# Patient Record
Sex: MALE | Race: WHITE | Employment: UNEMPLOYED | URBAN - METROPOLITAN AREA
[De-identification: names, ages, dates, MRNs, and addresses within clinical notes are randomized per-mention and may not be internally consistent; named-entity substitution may affect disease eponyms.]

---

## 2017-08-04 ENCOUNTER — HOSPITAL ENCOUNTER (OUTPATIENT)
Dept: CARDIAC CATH/INVASIVE PROCEDURES | Age: 57
Discharge: HOME | End: 2017-08-04
Attending: INTERNAL MEDICINE | Admitting: INTERNAL MEDICINE
Payer: SUBSIDIZED

## 2017-08-04 VITALS
OXYGEN SATURATION: 93 % | SYSTOLIC BLOOD PRESSURE: 95 MMHG | HEIGHT: 68 IN | RESPIRATION RATE: 12 BRPM | TEMPERATURE: 97.7 F | BODY MASS INDEX: 31.52 KG/M2 | DIASTOLIC BLOOD PRESSURE: 55 MMHG | WEIGHT: 208 LBS | HEART RATE: 59 BPM

## 2017-08-04 PROCEDURE — 74011250636 HC RX REV CODE- 250/636: Performed by: INTERNAL MEDICINE

## 2017-08-04 PROCEDURE — 77030013744

## 2017-08-04 PROCEDURE — 77030004533 HC CATH ANGI DX IMP BSC -B

## 2017-08-04 PROCEDURE — C1760 CLOSURE DEV, VASC: HCPCS

## 2017-08-04 PROCEDURE — C1894 INTRO/SHEATH, NON-LASER: HCPCS

## 2017-08-04 PROCEDURE — 93458 L HRT ARTERY/VENTRICLE ANGIO: CPT

## 2017-08-04 PROCEDURE — 74011000250 HC RX REV CODE- 250: Performed by: INTERNAL MEDICINE

## 2017-08-04 RX ORDER — ATROPINE SULFATE 0.1 MG/ML
.5-1 INJECTION INTRAVENOUS AS NEEDED
Status: DISCONTINUED | OUTPATIENT
Start: 2017-08-04 | End: 2017-08-04 | Stop reason: HOSPADM

## 2017-08-04 RX ORDER — SODIUM CHLORIDE 9 MG/ML
3 INJECTION, SOLUTION INTRAVENOUS CONTINUOUS
Status: DISPENSED | OUTPATIENT
Start: 2017-08-04 | End: 2017-08-04

## 2017-08-04 RX ORDER — FENTANYL CITRATE 50 UG/ML
25-200 INJECTION, SOLUTION INTRAMUSCULAR; INTRAVENOUS
Status: DISCONTINUED | OUTPATIENT
Start: 2017-08-04 | End: 2017-08-04 | Stop reason: HOSPADM

## 2017-08-04 RX ORDER — SODIUM CHLORIDE 0.9 % (FLUSH) 0.9 %
5-10 SYRINGE (ML) INJECTION AS NEEDED
Status: DISCONTINUED | OUTPATIENT
Start: 2017-08-04 | End: 2017-08-04 | Stop reason: HOSPADM

## 2017-08-04 RX ORDER — HEPARIN SODIUM 1000 [USP'U]/ML
1000-5000 INJECTION, SOLUTION INTRAVENOUS; SUBCUTANEOUS
Status: DISCONTINUED | OUTPATIENT
Start: 2017-08-04 | End: 2017-08-04 | Stop reason: HOSPADM

## 2017-08-04 RX ORDER — CLOPIDOGREL 300 MG/1
600 TABLET, FILM COATED ORAL ONCE
Status: DISCONTINUED | OUTPATIENT
Start: 2017-08-04 | End: 2017-08-04 | Stop reason: HOSPADM

## 2017-08-04 RX ORDER — SODIUM CHLORIDE 9 MG/ML
1.5 INJECTION, SOLUTION INTRAVENOUS CONTINUOUS
Status: DISPENSED | OUTPATIENT
Start: 2017-08-04 | End: 2017-08-04

## 2017-08-04 RX ORDER — MIDAZOLAM HYDROCHLORIDE 1 MG/ML
.5-1 INJECTION, SOLUTION INTRAMUSCULAR; INTRAVENOUS
Status: DISCONTINUED | OUTPATIENT
Start: 2017-08-04 | End: 2017-08-04 | Stop reason: HOSPADM

## 2017-08-04 RX ORDER — HEPARIN SODIUM 200 [USP'U]/100ML
2000 INJECTION, SOLUTION INTRAVENOUS AS NEEDED
Status: DISCONTINUED | OUTPATIENT
Start: 2017-08-04 | End: 2017-08-04 | Stop reason: HOSPADM

## 2017-08-04 RX ORDER — SPIRONOLACTONE 50 MG/1
50 TABLET, FILM COATED ORAL DAILY
Qty: 30 TAB | Refills: 6 | Status: SHIPPED | OUTPATIENT
Start: 2017-08-04

## 2017-08-04 RX ORDER — LIDOCAINE HYDROCHLORIDE 10 MG/ML
10-30 INJECTION INFILTRATION; PERINEURAL
Status: DISCONTINUED | OUTPATIENT
Start: 2017-08-04 | End: 2017-08-04 | Stop reason: HOSPADM

## 2017-08-04 RX ADMIN — FENTANYL CITRATE 50 MCG: 50 INJECTION, SOLUTION INTRAMUSCULAR; INTRAVENOUS at 09:22

## 2017-08-04 RX ADMIN — HEPARIN SODIUM 2000 UNITS: 200 INJECTION, SOLUTION INTRAVENOUS at 09:29

## 2017-08-04 RX ADMIN — FENTANYL CITRATE 50 MCG: 50 INJECTION, SOLUTION INTRAMUSCULAR; INTRAVENOUS at 09:33

## 2017-08-04 RX ADMIN — MIDAZOLAM HYDROCHLORIDE 2 MG: 1 INJECTION, SOLUTION INTRAMUSCULAR; INTRAVENOUS at 09:22

## 2017-08-04 RX ADMIN — Medication 10 ML: at 09:37

## 2017-08-04 RX ADMIN — MIDAZOLAM HYDROCHLORIDE 2 MG: 1 INJECTION, SOLUTION INTRAMUSCULAR; INTRAVENOUS at 09:33

## 2017-08-04 RX ADMIN — MIDAZOLAM HYDROCHLORIDE 1 MG: 1 INJECTION, SOLUTION INTRAMUSCULAR; INTRAVENOUS at 09:39

## 2017-08-04 RX ADMIN — SODIUM CHLORIDE 3 ML/KG/HR: 900 INJECTION, SOLUTION INTRAVENOUS at 08:25

## 2017-08-04 RX ADMIN — LIDOCAINE HYDROCHLORIDE 10 ML: 10 INJECTION, SOLUTION INFILTRATION; PERINEURAL at 09:36

## 2017-08-04 RX ADMIN — SODIUM CHLORIDE 1.5 ML/KG/HR: 900 INJECTION, SOLUTION INTRAVENOUS at 09:30

## 2017-08-04 NOTE — PROGRESS NOTES
TRANSFER - IN REPORT:    Verbal report received from Yisel Campbell RN on Soraya Ramos, Procedure Cath with no stent , from the Cardiac Cath lab, for routine progression of care. Report consisted of patients Situation, Background, Assessment and Recommendations(SBAR). Information from the following report(s) Procedure Summary, MAR and Recent Results was reviewed with the receiving clinician. Opportunity for questions and clarification was provided. Assessment completed upon patients arrival to [address] [place] [address] and care assumed. Cardiac Cath Lab Recovery Arrival Note:     Soraya Ramos arrived to [facility] recovery area. Patient procedure= Cardiac cath. Patient on cardiac monitor, non-invasive blood pressure, Patient status doing well without problems. Patient is Arousable&Ox 4. Patient reports no pain. Procedure site without any bleeding and no hematoma. Ulises Chávez

## 2017-08-04 NOTE — DISCHARGE INSTRUCTIONS
[email or web address] [facility]    Patient Discharge Instructions    Beatriz Webb / [id number] : 1960    Admitted 2017 Discharged: 2017       · It is important that you take the medication exactly as they are prescribed. · Keep your medication in the bottles provided by the pharmacist and keep a list of the medication names, dosages, and times to be taken in your wallet. · Do not take other medications without consulting your doctor. BRING ALL OF YOUR MEDICINES TO YOUR OFFICE VISIT with Dr. Donnis Mortimer with Leticia Patel MD in 2 week      Cardiac Catheterization  Discharge Instructions     Do not drive, operate any machinery, or sign any legal documents for 24 hours after your procedure. You must have someone to drive you home.  You may take a shower 24 hours after your cardiac catheterization. Be sure to get the dressing wet and then remove it; gently wash the area with warm soapy water. Pat dry and leave open to air. To help prevent infections, be sure to keep the cath site clean and dry. No lotions, creams, powders, ointments, etc. in the cath site for approximately 1 week.  Do not take a tub bath, get in a hot tub or swimming pool for approximately 5 days or until the cath site is completely healed.  No strenuous activity or heavy lifting over 10 lbs. for 7 days.  Drink plenty of fluids for 24-48 hours after your cath to flush the contrast dye from your kidneys. No alcoholic beverages for 24 hours. You may resume your previous diet (low fat, low cholesterol) after your cath.  After your cath, some bruising or discomfort is common during the healing process. Tylenol, 1-2 tablets every 6 hours as needed, is recommended if you experience any discomfort.   If you experience any signs or symptoms of infection such as fever, chills, or poorly healing incision, persistent tenderness or swelling in the groin, redness and/or warmth to the touch, numbness, significant tingling or pain at the groin site or affected extremity, rash, drainage from the cath site, or if the leg feels tight or swollen, call your physician right away.  If bleeding at the cath site occurs, take a clean gauze pad and apply direct pressure to the groin just above the puncture site. Call 911 immediately, and continue to apply direct pressure until an ambulance gets to your location.  You may return to work  2  days after your cardiac cath if no groin bleeding. Information obtained by :  I understand that if any problems occur once I am at home I am to contact my physician. I understand and acknowledge receipt of the instructions indicated above.                                                                                                                                            R.N.'s Signature                                                                  Date/Time                                                                                                                                              Patient or Representative Signature                                                          Date/Time      Paresh Smith MD

## 2017-08-04 NOTE — ROUTINE PROCESS
Cardiac Cath Lab Recovery Arrival Note:      Beatriz Webb arrived to Cardiac Cath Lab, Recovery Area. Staff introduced to patient. Patient identifiers verified with NAME and DATE OF BIRTH. Procedure verified with patient. Consent forms reviewed and signed by patient or authorized representative and verified. Allergies verified. Patient and family oriented to department. Patient and family informed of procedure and plan of care. Questions answered with review. Patient prepped for procedure, per orders from physician, prior to arrival.    Patient on cardiac monitor, non-invasive blood pressure, SPO2 monitor. On room air. Patient is A&Ox 3. Patient reports no CP. Patient in stretcher, in low position, with side rails up, call bell within reach, patient instructed to call if assistance as needed.     Patient prep in: [facility] Recovery Area, [place] 2  Prep by: DON

## 2017-08-04 NOTE — IP AVS SNAPSHOT
[address] [facility] [address] [place] [address] 
[phone] Patient: Cleo Cooper MRN: [id number] You are allergic to the following Allergen Reactions Lisinopril Other (comments) Elevated K+ Codeine Nausea and Vomiting Hydrocodone Other (comments)  
 hypotension Recent Documentation Height Weight BMI Smoking Status 1.727 m 94.3 kg 31.63 kg/m2 Current Every Day Smoker Emergency Contacts Name Discharge Info Relation Home Work Mobile Kaykay Sapp DISCHARGE CAREGIVER [3] Friend [5] [phone] About your hospitalization You were admitted on:  August 4, 2017 You last received care in the:  [address] [facility] Dr CATH LAB You were discharged on:  August 4, 2017 Unit phone number:  [phone] Why you were hospitalized Your primary diagnosis was:  Not on File Providers Seen During Your Hospitalizations Provider Role Specialty Primary office phone Antoni Fox MD Attending Provider Cardiology [phone] Your Primary Care Physician (PCP) Primary Care Physician Office Phone Office Fax OTHER, PHYS ** None ** ** None ** Follow-up Information Follow up With Details Comments Contact Info Slava Munguia MD   Patient can only remember the practice name and not the physician Antoni Fox MD Schedule an appointment as soon as possible for a visit in 2 weeks FOLLOW UP WITH DR. Zena Jade [place] [address] [facility] [address] [place] [address] 
[phone] Current Discharge Medication List  
  
START taking these medications Dose & Instructions Dispensing Information Comments Morning Noon Evening Bedtime  
 spironolactone 50 mg tablet Commonly known as:  ALDACTONE Your last dose was: Your next dose is:    
   
   
 Dose:  50 mg Take 1 Tab by mouth daily. Quantity:  30 Tab Refills:  6 CONTINUE these medications which have NOT CHANGED Dose & Instructions Dispensing Information Comments Morning Noon Evening Bedtime  
 aspirin 325 mg tablet Commonly known as:  ASPIRIN Your last dose was: Your next dose is:    
   
   
 Dose:  325 mg Take 325 mg by mouth daily. Refills:  0  
     
   
   
   
  
 carvedilol 6.25 mg tablet Commonly known as:  Alison Ni Your last dose was: Your next dose is:    
   
   
 Dose:  6.25 mg Take 6.25 mg by mouth two (2) times daily (with meals). Indications: HYPERTENSION Refills:  0 CeleXA 40 mg tablet Generic drug:  citalopram  
   
Your last dose was: Your next dose is:    
   
   
 Dose:  40 mg Take 40 mg by mouth daily. Indications: MAJOR DEPRESSIVE DISORDER Refills:  0  
     
   
   
   
  
 CRESTOR 10 mg tablet Generic drug:  rosuvastatin Your last dose was: Your next dose is:    
   
   
 Dose:  30 mg Take 30 mg by mouth nightly. Indications: HYPERCHOLESTEROLEMIA Refills:  0  
     
   
   
   
  
 losartan 50 mg tablet Commonly known as:  COZAAR Your last dose was: Your next dose is:    
   
   
 Dose:  50 mg Take 50 mg by mouth daily. Refills:  0 Where to Get Your Medications Information on where to get these meds will be given to you by the nurse or doctor. ! Ask your nurse or doctor about these medications  
  spironolactone 50 mg tablet Discharge Instructions [email or web address] Patient Discharge Instructions Sylwia Laura / [id number] : 1960 Admitted 2017 Discharged: 2017 · It is important that you take the medication exactly as they are prescribed. · Keep your medication in the bottles provided by the pharmacist and keep a list of the medication names, dosages, and times to be taken in your wallet. · Do not take other medications without consulting your doctor. BRING ALL OF YOUR MEDICINES TO YOUR OFFICE VISIT with Dr. Jocelin Pruitt with Magaly Hankins MD in 2 week Cardiac Catheterization  Discharge Instructions ? Do not drive, operate any machinery, or sign any legal documents for 24 hours after your procedure. You must have someone to drive you home. ? You may take a shower 24 hours after your cardiac catheterization. Be sure to get the dressing wet and then remove it; gently wash the area with warm soapy water. Pat dry and leave open to air. To help prevent infections, be sure to keep the cath site clean and dry. No lotions, creams, powders, ointments, etc. in the cath site for approximately 1 week. ? Do not take a tub bath, get in a hot tub or swimming pool for approximately 5 days or until the cath site is completely healed. ? No strenuous activity or heavy lifting over 10 lbs. for 7 days. ? Drink plenty of fluids for 24-48 hours after your cath to flush the contrast dye from your kidneys. No alcoholic beverages for 24 hours. You may resume your previous diet (low fat, low cholesterol) after your cath. ? After your cath, some bruising or discomfort is common during the healing process. Tylenol, 1-2 tablets every 6 hours as needed, is recommended if you experience any discomfort. If you experience any signs or symptoms of infection such as fever, chills, or poorly healing incision, persistent tenderness or swelling in the groin, redness and/or warmth to the touch, numbness, significant tingling or pain at the groin site or affected extremity, rash, drainage from the cath site, or if the leg feels tight or swollen, call your physician right away. ? If bleeding at the cath site occurs, take a clean gauze pad and apply direct pressure to the groin just above the puncture site.   Call 911 immediately, and continue to apply direct pressure until an ambulance gets to your location. ? You may return to work  2  days after your cardiac cath if no groin bleeding. Information obtained by : 
I understand that if any problems occur once I am at home I am to contact my physician. I understand and acknowledge receipt of the instructions indicated above. R.N.'s Signature                                                                  Date/Time Patient or Representative Signature                                                          Date/Time Heriberto Espinoza MD 
 
 
Discharge Orders None Introducing [facility] SERVICES! [employer] introduces [employer] patient portal. Now you can access parts of your medical record, email your doctor's office, and request medication refills online. 1. In your internet browser, go to [email or web address] 2. Click on the First Time User? Click Here link in the Sign In box. You will see the New Member Sign Up page. 3. Enter your [employer] Access Code exactly as it appears below. You will not need to use this code after youve completed the sign-up process. If you do not sign up before the expiration date, you must request a new code. · [employer] Access Code: [email or web address] Expires: 10/30/2017  7:44 AM 
 
4. Enter the last four digits of your Social Security Number (xxxx) and Date of Birth (mm/dd/yyyy) as indicated and click Submit. You will be taken to the next sign-up page. 5. Create a [employer] ID. This will be your [employer] login ID and cannot be changed, so think of one that is secure and easy to remember. 6. Create a z[employer]wot password. You can change your password at any time. 7. Enter your Password Reset Question and Answer. This can be used at a later time if you forget your password. 8. Enter your e-mail address. You will receive e-mail notification when new information is available in [address] 9. Click Sign Up. You can now view and download portions of your medical record. 10. Click the Download Summary menu link to download a portable copy of your medical information. If you have questions, please visit the Frequently Asked Questions section of the [facility] website. Remember, [facility] is NOT to be used for urgent needs. For medical emergencies, dial 911. Now available from your iPhone and Android! General Information Please provide this summary of care documentation to your next provider. Patient Signature:  ____________________________________________________________ Date:  ____________________________________________________________  
  
Sarah Song Provider Signature:  ____________________________________________________________ Date:  ____________________________________________________________

## 2017-08-04 NOTE — PROGRESS NOTES
Cardiac Cath Lab Procedure Area Arrival Note:    Sylwia Laura arrived to Cardiac Cath Lab, Procedure Area. Patient identifiers verified with NAME and DATE OF BIRTH. Procedure verified with patient. Consent forms verified. Allergies verified. Patient informed of procedure and plan of care. Questions answered with review. Patient voiced understanding of procedure and plan of care. Patient on cardiac monitor, non-invasive blood pressure, SPO2 monitor. On O2 @ 2 lpm via nasal cannula. IV of normal saline on pump at 283 ml/hr. Patient status doing well without problems. Patient is A&Ox 4. Patient reports no pain. Patient medicated during procedure with orders obtained and verified by Dr. Shan Sánchez. Refer to patients Cardiac Cath Lab PROCEDURE REPORT for vital signs, assessment, status, and response during procedure, printed at end of case. Printed report on chart or scanned into chart.

## 2017-08-04 NOTE — PROCEDURES
Cardiac Catheterization Procedure Note   Patient: Tim Carpio  MRN: [id number]  SSN: [id number]   YOB: 1960 Age: 59 y.o.   Sex: male    Date of Procedure: 8/4/2017   Pre-procedure Diagnosis: Shortness of Breath  Post-procedure Diagnosis: Coronary Artery Disease/Cardiomyopathy  Procedure: Left Heart Cath  :  Dr. Tamica Ramirez MD    Assistant(s):  None  Anesthesia: Moderate Sedation   Estimated Blood Loss: Less than 10 mL   Specimens Removed: None  Findings: LVG; dilated LV; EF is 35% with more or less global hypokinesis; EDP is elevated 24 mmHg; LNM is normal LAD with minor lesions; LCX is dominant with proximal and mid stent patent and distal diffuse 50% lesion prior to LPDA; RCA is small to medium caliber with diffuse 50% lesion    Plan; treat underlying CM; add diuretic to his oreg and Cozaar  Complications: None   Implants:  None  Signed by:  Tamica Ramirez MD  8/4/2017  9:52 AM

## 2017-08-07 ENCOUNTER — TELEPHONE (OUTPATIENT)
Dept: CARDIAC CATH/INVASIVE PROCEDURES | Age: 57
End: 2017-08-07

## 2018-11-08 ENCOUNTER — OFFICE VISIT (OUTPATIENT)
Dept: FAMILY MEDICINE CLINIC | Age: 58
End: 2018-11-08

## 2018-11-08 ENCOUNTER — TELEPHONE (OUTPATIENT)
Dept: FAMILY MEDICINE CLINIC | Age: 58
End: 2018-11-08

## 2018-11-08 VITALS
WEIGHT: 210.1 LBS | SYSTOLIC BLOOD PRESSURE: 111 MMHG | TEMPERATURE: 97.3 F | HEIGHT: 70 IN | RESPIRATION RATE: 17 BRPM | HEART RATE: 69 BPM | OXYGEN SATURATION: 96 % | DIASTOLIC BLOOD PRESSURE: 76 MMHG | BODY MASS INDEX: 30.08 KG/M2

## 2018-11-08 DIAGNOSIS — G89.29 CHRONIC BILATERAL LOW BACK PAIN WITHOUT SCIATICA: Primary | ICD-10-CM

## 2018-11-08 DIAGNOSIS — Z01.89 LABORATORY EXAMINATION: ICD-10-CM

## 2018-11-08 DIAGNOSIS — M54.50 CHRONIC BILATERAL LOW BACK PAIN WITHOUT SCIATICA: Primary | ICD-10-CM

## 2018-11-08 DIAGNOSIS — I25.10 CORONARY ARTERY DISEASE INVOLVING NATIVE CORONARY ARTERY OF NATIVE HEART WITHOUT ANGINA PECTORIS: ICD-10-CM

## 2018-11-08 DIAGNOSIS — F17.210 CIGARETTE SMOKER: ICD-10-CM

## 2018-11-08 DIAGNOSIS — R11.2 NAUSEA AND VOMITING, INTRACTABILITY OF VOMITING NOT SPECIFIED, UNSPECIFIED VOMITING TYPE: ICD-10-CM

## 2018-11-08 DIAGNOSIS — F32.A DEPRESSION, UNSPECIFIED DEPRESSION TYPE: ICD-10-CM

## 2018-11-08 DIAGNOSIS — N62 GYNECOMASTIA, MALE: ICD-10-CM

## 2018-11-08 DIAGNOSIS — Z23 ENCOUNTER FOR IMMUNIZATION: ICD-10-CM

## 2018-11-08 DIAGNOSIS — I50.9 CHRONIC CONGESTIVE HEART FAILURE, UNSPECIFIED HEART FAILURE TYPE (HCC): ICD-10-CM

## 2018-11-08 DIAGNOSIS — J44.9 CHRONIC OBSTRUCTIVE PULMONARY DISEASE, UNSPECIFIED COPD TYPE (HCC): ICD-10-CM

## 2018-11-08 RX ORDER — CYCLOBENZAPRINE HCL 10 MG
5-10 TABLET ORAL AS NEEDED
Qty: 30 TAB | Refills: 0 | Status: SHIPPED | OUTPATIENT
Start: 2018-11-08

## 2018-11-08 RX ORDER — CYCLOBENZAPRINE HCL 10 MG
TABLET ORAL
COMMUNITY
End: 2018-11-08 | Stop reason: SDUPTHER

## 2018-11-08 RX ORDER — ROSUVASTATIN CALCIUM 10 MG/1
30 TABLET, COATED ORAL
Qty: 90 TAB | Refills: 0 | Status: SHIPPED | OUTPATIENT
Start: 2018-11-08

## 2018-11-08 NOTE — PROGRESS NOTES
Immunization/s administered 11/8/2018 by Annabelle Cedillo with patient's consent. Patient tolerated procedure well. No reactions noted.

## 2018-11-08 NOTE — PROGRESS NOTES
Perla Evans  Identified pt with two pt identifiers(name and ). Chief Complaint Patient presents with  New Patient [facility]  Breast Problem  
  stated that the area is very sensitive and that he can feel a lump  GI Problem 1. Have you been to the ER, urgent care clinic since your last visit? Hospitalized since your last visit? No 
 
2. Have you seen or consulted any other health care providers outside of the [address] [place] [address] since your last visit? Include any pap smears or colon screening. No 
 
In the event something were to happen to you and you were unable to speak on your behalf, do you have an Advance Directive/ Living Will in place stating your wishes? NO If yes, do we have a copy on file NO If no, would you like information:     
 
 
 
Would you like to sign up for [employer] today, if you have not already done so? Patient declined If not, would you like information on [employer], and how to sign up at a later time? No 
 
 
Medication reconciliation up to date and corrected with patient at this time. Today's provider has been notified of reason for visit, vitals and flowsheets obtained on patients. Reviewed record in preparation for visit, huddled with provider and have obtained necessary documentation. Health Maintenance Due Topic  Pneumococcal 19-64 Medium Risk (1 of 1 - PPSV23)  DTaP/Tdap/Td series (1 - Tdap)  Shingrix Vaccine Age 50> (1 of 2)  FOBT Q 1 YEAR AGE 50-75  Influenza Age 5 to Adult Wt Readings from Last 3 Encounters:  
17 208 lb (94.3 kg) 14 193 lb (87.5 kg) 14 195 lb (88.5 kg) Temp Readings from Last 3 Encounters:  
17 97.7 °F (36.5 °C)  
14 98 °F (36.7 °C)  
14 98.5 °F (36.9 °C) BP Readings from Last 3 Encounters:  
17 95/55  
14 100/84  
14 112/76 Pulse Readings from Last 3 Encounters:  
17 (!) 59  
14 73  
14 80 Vitals:  
 11/08/18 1332 BP: 111/76 Pulse: 69 Resp: 17 Temp: 97.3 °F (36.3 °C) TempSrc: Oral  
SpO2: 96% Weight: 210 lb 1.6 oz (95.3 kg) Height: 5' 9.53\" (1.766 m) PainSc:   8 Learning Assessment: 
:  
 
Learning Assessment 11/8/2018 PRIMARY LEARNER Patient PRIMARY LANGUAGE ENGLISH  
LEARNER PREFERENCE PRIMARY OTHER (COMMENT) ANSWERED BY PATIENT  
RELATIONSHIP SELF Depression Screening: 
:  
 
PHQ over the last two weeks 11/8/2018 PHQ Not Done Active Diagnosis of Depression or Bipolar Disorder Little interest or pleasure in doing things Nearly every day Feeling down, depressed, irritable, or hopeless Nearly every day Total Score PHQ 2 6 Trouble falling or staying asleep, or sleeping too much Nearly every day Feeling tired or having little energy Nearly every day Poor appetite, weight loss, or overeating Nearly every day Feeling bad about yourself - or that you are a failure or have let yourself or your family down More than half the days Trouble concentrating on things such as school, work, reading, or watching TV More than half the days Moving or speaking so slowly that other people could have noticed; or the opposite being so fidgety that others notice Nearly every day Thoughts of being better off dead, or hurting yourself in some way Several days PHQ 9 Score 23 How difficult have these problems made it for you to do your work, take care of your home and get along with others Very difficult Fall Risk Assessment: 
:  
 
Fall Risk Assessment, last 12 mths 11/8/2018 Able to walk? Yes Fall in past 12 months? No  
 
 
Abuse Screening: 
:  
 
Abuse Screening Questionnaire 11/8/2018 Do you ever feel afraid of your partner? Thiago So Are you in a relationship with someone who physically or mentally threatens you? Thiago So Is it safe for you to go home? Y  
 
 
ADL Screening: 
:  
 
ADL Assessment 11/8/2018 Feeding yourself No Help Needed Getting from bed to chair No Help Needed Getting dressed No Help Needed Bathing or showering No Help Needed Walk across the room (includes cane/walker) No Help Needed Using the telphone No Help Needed Taking your medications No Help Needed Preparing meals Help Needed Managing money (expenses/bills) No Help Needed Moderately strenuous housework (laundry) Help Needed Shopping for personal items (toiletries/medicines) No Help Needed Shopping for groceries No Help Needed Driving No Help Needed Climbing a flight of stairs No Help Needed Getting to places beyond walking distances No Help Needed

## 2018-11-08 NOTE — PROGRESS NOTES
Given out name from Medicaid. Sees card 2 x annually. Dr. Duane Fass at [address] [facility] psych q 3 mos. Had labs 9-10 mos ago at [facility]. Takes flexeril for severe back pain. Has IBS/spastic colon. Gets nausea and vomiting. GERD on TUMs. Knots both breasts. No discharge. Painful past 4-6 mos. On Crestor past 12 years. Had MI and flat line 12 years ago. Visit Vitals /76 (BP 1 Location: Left arm, BP Patient Position: Sitting) Pulse 69 Temp 97.3 °F (36.3 °C) (Oral) Resp 17 Ht 5' 9.53\" (1.766 m) Wt 210 lb 1.6 oz (95.3 kg) SpO2 96% BMI 30.56 kg/m² Patient alert and cooperative. Bilateral breasts with lumpy bumpy tender masses bilaterally. No discharge appreciated. No predominant mass. Assessment: 
1. Gynecomastia in the setting of statin therapy. 2. CAD, CHF, COPD and a smoker. 3. IBS. 4. GERD. 5. History of vomiting blood. Plan: 1. Refilled Flexeril. 2. Return for fasting labs, including testosterone. 3. Set up GI referral for further evaluation of IBS and GERD. 4. Follow otherwise here prn.

## 2018-11-09 DIAGNOSIS — G89.29 CHRONIC BILATERAL LOW BACK PAIN WITHOUT SCIATICA: ICD-10-CM

## 2018-11-09 DIAGNOSIS — M54.50 CHRONIC BILATERAL LOW BACK PAIN WITHOUT SCIATICA: ICD-10-CM

## 2018-11-09 NOTE — TELEPHONE ENCOUNTER
Writer called [address] back regarding Flexeril. Writer spoke with Ailyn Horton. Patient's name and . Per Ailyn Horton, they need a clarification of frequency, how many times a day patient is supposed to be taking it. Writer verbalized understanding. Writer told Ailyn Horton that Dr. Lori Laughlin is out of the office today, and message will be sent to both him and on-call provider, Jules Cerrato. And a phone call back will be given once a clarification is received. Ailyn Horton verbalized understanding and appreciation.

## 2018-11-09 NOTE — TELEPHONE ENCOUNTER
Writer called pharmacy back with clarification on patient's flexeril. Writer spoke with Nivia Rivas. Writer gave Nivia Rivas directions for patient's flexeril per Irving Sexton NP. Directions as follows: 5-10mg as needed every 8 hours and not to exceed 30mg in 24 hour period. Nivia Rivas verbalized understanding and appreciation.

## 2018-11-15 DIAGNOSIS — R73.09 INCREASED GLUCOSE LEVEL: Primary | ICD-10-CM

## 2018-11-15 LAB
25(OH)D3+25(OH)D2 SERPL-MCNC: 37.7 NG/ML (ref 30–100)
ALBUMIN SERPL-MCNC: 4.5 G/DL (ref 3.6–4.8)
ALBUMIN/GLOB SERPL: 1.9 {RATIO} (ref 1.2–2.2)
ALP SERPL-CCNC: 71 IU/L (ref 39–117)
ALT SERPL-CCNC: 18 IU/L (ref 0–44)
APPEARANCE UR: CLEAR
AST SERPL-CCNC: 19 IU/L (ref 0–40)
BACTERIA #/AREA URNS HPF: NORMAL /[HPF]
BASOPHILS # BLD AUTO: 0 X10E3/UL (ref 0–0.2)
BASOPHILS NFR BLD AUTO: 0 %
BILIRUB SERPL-MCNC: 0.7 MG/DL (ref 0–1.2)
BILIRUB UR QL STRIP: NEGATIVE
BUN SERPL-MCNC: 13 MG/DL (ref 8–27)
BUN/CREAT SERPL: 12 (ref 10–24)
CALCIUM SERPL-MCNC: 9.9 MG/DL (ref 8.6–10.2)
CASTS URNS QL MICRO: NORMAL /LPF
CHLORIDE SERPL-SCNC: 102 MMOL/L (ref 96–106)
CHOLEST SERPL-MCNC: 137 MG/DL (ref 100–199)
CO2 SERPL-SCNC: 26 MMOL/L (ref 20–29)
COLOR UR: YELLOW
CREAT SERPL-MCNC: 1.05 MG/DL (ref 0.76–1.27)
EOSINOPHIL # BLD AUTO: 0.5 X10E3/UL (ref 0–0.4)
EOSINOPHIL NFR BLD AUTO: 6 %
EPI CELLS #/AREA URNS HPF: NORMAL /HPF
ERYTHROCYTE [DISTWIDTH] IN BLOOD BY AUTOMATED COUNT: 13 % (ref 12.3–15.4)
GLOBULIN SER CALC-MCNC: 2.4 G/DL (ref 1.5–4.5)
GLUCOSE SERPL-MCNC: 111 MG/DL (ref 65–99)
GLUCOSE UR QL: NEGATIVE
HCT VFR BLD AUTO: 48.8 % (ref 37.5–51)
HDLC SERPL-MCNC: 51 MG/DL
HGB BLD-MCNC: 16.4 G/DL (ref 13–17.7)
HGB UR QL STRIP: ABNORMAL
IMM GRANULOCYTES # BLD: 0 X10E3/UL (ref 0–0.1)
IMM GRANULOCYTES NFR BLD: 0 %
INTERPRETATION, 910389: NORMAL
KETONES UR QL STRIP: NEGATIVE
LDLC SERPL CALC-MCNC: 64 MG/DL (ref 0–99)
LEUKOCYTE ESTERASE UR QL STRIP: NEGATIVE
LYMPHOCYTES # BLD AUTO: 2.6 X10E3/UL (ref 0.7–3.1)
LYMPHOCYTES NFR BLD AUTO: 27 %
MCH RBC QN AUTO: 31.2 PG (ref 26.6–33)
MCHC RBC AUTO-ENTMCNC: 33.6 G/DL (ref 31.5–35.7)
MCV RBC AUTO: 93 FL (ref 79–97)
MICRO URNS: ABNORMAL
MONOCYTES # BLD AUTO: 0.8 X10E3/UL (ref 0.1–0.9)
MONOCYTES NFR BLD AUTO: 8 %
MUCOUS THREADS URNS QL MICRO: PRESENT
NEUTROPHILS # BLD AUTO: 5.5 X10E3/UL (ref 1.4–7)
NEUTROPHILS NFR BLD AUTO: 59 %
NITRITE UR QL STRIP: NEGATIVE
PH UR STRIP: 6.5 [PH] (ref 5–7.5)
PLATELET # BLD AUTO: 297 X10E3/UL (ref 150–379)
POTASSIUM SERPL-SCNC: 5.1 MMOL/L (ref 3.5–5.2)
PROT SERPL-MCNC: 6.9 G/DL (ref 6–8.5)
PROT UR QL STRIP: NEGATIVE
PSA SERPL-MCNC: 2.4 NG/ML (ref 0–4)
RBC # BLD AUTO: 5.25 X10E6/UL (ref 4.14–5.8)
RBC #/AREA URNS HPF: NORMAL /HPF
SODIUM SERPL-SCNC: 144 MMOL/L (ref 134–144)
SP GR UR: 1.01 (ref 1–1.03)
TESTOST FREE SERPL-MCNC: 7.4 PG/ML (ref 6.6–18.1)
TESTOST SERPL-MCNC: 416 NG/DL (ref 264–916)
TRIGL SERPL-MCNC: 111 MG/DL (ref 0–149)
TSH SERPL DL<=0.005 MIU/L-ACNC: 2.58 UIU/ML (ref 0.45–4.5)
UROBILINOGEN UR STRIP-MCNC: 0.2 MG/DL (ref 0.2–1)
VLDLC SERPL CALC-MCNC: 22 MG/DL (ref 5–40)
WBC # BLD AUTO: 9.4 X10E3/UL (ref 3.4–10.8)
WBC #/AREA URNS HPF: NORMAL /HPF

## 2018-11-16 LAB
EST. AVERAGE GLUCOSE BLD GHB EST-MCNC: 134 MG/DL
HBA1C MFR BLD: 6.3 % (ref 4.8–5.6)
SPECIMEN STATUS REPORT, ROLRST: NORMAL

## 2018-11-16 NOTE — PROGRESS NOTES
Writer reviewed chart. A1C was done and came back on 11/15/18. Writer called patient back and notified that it has been added and he did not need to come in and have it done.

## 2018-11-16 NOTE — PROGRESS NOTES
Writer called patient to notify of results and recommendations from Dr. Gianluca Chau. Writer spoke with patient. Patient verified . Writer notified patient. Patient verbalized understanding and appreciation. Will make lab only appointment to have A1C done.

## 2018-11-20 NOTE — PROGRESS NOTES
Writer called patient to notify of lab results and recommendations from Dr. Andree Booth. Writer spoke with patient. Patient verified . Writer notified patient. Patient verbalized understanding and appreciation.

## 2019-01-03 PROBLEM — E55.9 VITAMIN D DEFICIENCY: Status: ACTIVE | Noted: 2019-01-03

## 2019-02-14 ENCOUNTER — OFFICE VISIT (OUTPATIENT)
Dept: FAMILY MEDICINE CLINIC | Age: 59
End: 2019-02-14

## 2019-02-14 VITALS
TEMPERATURE: 97.6 F | HEART RATE: 70 BPM | BODY MASS INDEX: 30.88 KG/M2 | SYSTOLIC BLOOD PRESSURE: 107 MMHG | OXYGEN SATURATION: 95 % | HEIGHT: 70 IN | DIASTOLIC BLOOD PRESSURE: 71 MMHG | RESPIRATION RATE: 22 BRPM | WEIGHT: 215.7 LBS

## 2019-02-14 DIAGNOSIS — F33.1 DEPRESSION, MAJOR, RECURRENT, MODERATE (HCC): ICD-10-CM

## 2019-02-14 DIAGNOSIS — Z23 ENCOUNTER FOR IMMUNIZATION: ICD-10-CM

## 2019-02-14 DIAGNOSIS — H61.23 BILATERAL IMPACTED CERUMEN: ICD-10-CM

## 2019-02-14 DIAGNOSIS — M54.50 CHRONIC BILATERAL LOW BACK PAIN WITHOUT SCIATICA: ICD-10-CM

## 2019-02-14 DIAGNOSIS — Z00.00 PHYSICAL EXAM: ICD-10-CM

## 2019-02-14 DIAGNOSIS — R10.9 ABDOMINAL PAIN, UNSPECIFIED ABDOMINAL LOCATION: ICD-10-CM

## 2019-02-14 DIAGNOSIS — G89.29 CHRONIC BILATERAL LOW BACK PAIN WITHOUT SCIATICA: ICD-10-CM

## 2019-02-14 DIAGNOSIS — I50.9 CHRONIC CONGESTIVE HEART FAILURE, UNSPECIFIED HEART FAILURE TYPE (HCC): ICD-10-CM

## 2019-02-14 DIAGNOSIS — K21.9 GASTROESOPHAGEAL REFLUX DISEASE, ESOPHAGITIS PRESENCE NOT SPECIFIED: Primary | ICD-10-CM

## 2019-02-14 DIAGNOSIS — R11.2 NON-INTRACTABLE VOMITING WITH NAUSEA, UNSPECIFIED VOMITING TYPE: ICD-10-CM

## 2019-02-14 DIAGNOSIS — F17.210 CIGARETTE SMOKER: ICD-10-CM

## 2019-02-14 DIAGNOSIS — J44.9 CHRONIC OBSTRUCTIVE PULMONARY DISEASE, UNSPECIFIED COPD TYPE (HCC): ICD-10-CM

## 2019-02-14 RX ORDER — OMEGA-3-ACID ETHYL ESTERS 1 G/1
CAPSULE, LIQUID FILLED ORAL
Status: CANCELLED | OUTPATIENT
Start: 2019-02-14

## 2019-02-14 NOTE — ACP (ADVANCE CARE PLANNING)
Advance Care Planning (ACP)       Attempted to discuss ACP with Mr. Walker Diaz today, he declines to discuss at this time. Will readdress at future visit.

## 2019-02-14 NOTE — PATIENT INSTRUCTIONS

## 2019-02-14 NOTE — PROGRESS NOTES
HISTORY OF PRESENT ILLNESS Nick Ojeda is a 59 y.o. male. HPI Here for [facility]. Eye doctor 2 yrs. Dentist 3 yrs. Last colonoscopy 3-4 yrs ago. IBS bothering. GERD is worse. Rec take Zantac 2 x daily. Card 1-2 x annually. Smokes 1/2 PPD. No signif hx past yr. Patient Active Problem List  
Diagnosis Code  Encounter for screening colonoscopy Z12.11  
 Sigmoid diverticulosis K57.30  Chronic bilateral low back pain without sciatica M54.5, G89.29  
 Nausea and vomiting R11.2  Gynecomastia, male N58  Cigarette smoker F17.210  Chronic obstructive pulmonary disease ([facility] [address] J44.9  Coronary artery disease involving native coronary artery of native heart without angina pectoris I25.10  Depression F32.9  CHF (congestive heart failure) ([facility]) I50.9  Vitamin D deficiency E55.9 Current Outpatient Medications Medication Sig Dispense Refill  omega-3 acid ethyl esters (LOVAZA PO) Take  by mouth.  albuterol sulfate (VENTOLIN HFA IN) Take  by inhalation as needed (Pt stated that he uses about once month).  cyclobenzaprine (FLEXERIL) 10 mg tablet Take 0.5-1 Tabs by mouth as needed for Muscle Spasm(s). 30 Tab 0  
 rosuvastatin (CRESTOR) 10 mg tablet Take 3 Tabs by mouth nightly. 90 Tab 0  
 spironolactone (ALDACTONE) 50 mg tablet Take 1 Tab by mouth daily. 30 Tab 6  
 losartan (COZAAR) 50 mg tablet Take 50 mg by mouth daily.  carvedilol (COREG) 6.25 mg tablet Take 6.25 mg by mouth two (2) times daily (with meals). Indications: HYPERTENSION  aspirin (ASPIRIN) 325 mg tablet Take 325 mg by mouth daily.  citalopram (CELEXA) 40 mg tablet Take 40 mg by mouth daily. Indications: MAJOR DEPRESSIVE DISORDER Allergies Allergen Reactions  Lisinopril Other (comments) Elevated K+  Codeine Nausea and Vomiting  Hydrocodone Other (comments)  
  hypotension Past Medical History:  
Diagnosis Date  Arthritis   
 osteo-- primarily affecting back, hands  Asthma   
 last exacerbation 1 yr ago. Uses albuterol rarely only. Never hospitalized  Depression  Eczema  GERD (gastroesophageal reflux disease)   
 occasional only  Heart attack [facility]) 2006 [address];  stents x2. Last stress test 2009--WNL  Hypercholesteremia  Hypertension   
 denies this  IBS (irritable bowel syndrome) Past Surgical History:  
Procedure Laterality Date  CARDIAC SURG PROCEDURE UNLIST  2006  
 stent placed s/p MI  
 HX ORTHOPAEDIC  2008 Rt knee reconstruction History reviewed. No pertinent family history. Social History Tobacco Use  Smoking status: Current Every Day Smoker Packs/day: 0.50  Smokeless tobacco: Never Used Substance Use Topics  Alcohol use: No  
  
Lab Results Component Value Date/Time WBC 9.4 11/14/2018 11:01 AM  
 HGB 16.4 11/14/2018 11:01 AM  
 HCT 48.8 11/14/2018 11:01 AM  
 PLATELET 261 00/19/1808 11:01 AM  
 MCV 93 11/14/2018 11:01 AM  
 
Lab Results Component Value Date/Time Hemoglobin A1c 6.3 (H) 11/14/2018 11:01 AM  
 Glucose 111 (H) 11/14/2018 11:01 AM  
 Microalbumin/Creat ratio (mg/g creat) 17 03/21/2016 10:20 AM  
 Microalbumin,urine random 1.63 03/21/2016 10:20 AM  
 LDL, calculated 64 11/14/2018 11:01 AM  
 Creatinine 1.05 11/14/2018 11:01 AM  
  
Lab Results Component Value Date/Time Cholesterol, total 137 11/14/2018 11:01 AM  
 HDL Cholesterol 51 11/14/2018 11:01 AM  
 LDL, calculated 64 11/14/2018 11:01 AM  
 Triglyceride 111 11/14/2018 11:01 AM  
 
Lab Results Component Value Date/Time ALT (SGPT) 18 11/14/2018 11:01 AM  
 AST (SGOT) 19 11/14/2018 11:01 AM  
 Alk. phosphatase 71 11/14/2018 11:01 AM  
 Bilirubin, total 0.7 11/14/2018 11:01 AM  
 Albumin 4.5 11/14/2018 11:01 AM  
 Protein, total 6.9 11/14/2018 11:01 AM  
 PLATELET 639 72/61/7777 11:01 AM  
 
Lab Results Component Value Date/Time  GFR est non-AA 77 11/14/2018 11:01 AM  
 GFR est AA 89 11/14/2018 11:01 AM  
 Creatinine 1.05 11/14/2018 11:01 AM  
 BUN 13 11/14/2018 11:01 AM  
 Sodium 144 11/14/2018 11:01 AM  
 Potassium 5.1 11/14/2018 11:01 AM  
 Chloride 102 11/14/2018 11:01 AM  
 CO2 26 11/14/2018 11:01 AM  
 
Lab Results Component Value Date/Time TSH 2.580 11/14/2018 11:01 AM  
  
Lab Results Component Value Date/Time Glucose 111 (H) 11/14/2018 11:01 AM  
 
Lab Results Component Value Date/Time  
 Prostate Specific Ag 2.4 11/14/2018 11:01 AM  
 
   
Review of Systems HENT: Positive for congestion. Eyes: Negative. Respiratory: Positive for cough, sputum production, shortness of breath and wheezing. Cardiovascular: Negative. Gastrointestinal: Positive for abdominal pain, blood in stool, constipation, diarrhea, heartburn, nausea and vomiting. Genitourinary: Positive for dysuria. Musculoskeletal: Positive for back pain and joint pain. Skin: Positive for itching and rash. Neurological: Positive for weakness. Endo/Heme/Allergies: Positive for environmental allergies. Bruises/bleeds easily. Psychiatric/Behavioral: Positive for depression and suicidal ideas. The patient is nervous/anxious. Physical Exam  
Vitals:  
 02/14/19 1118 BP: 107/71 Pulse: 70 Resp: 22 Temp: 97.6 °F (36.4 °C) TempSrc: Oral  
SpO2: 95% Weight: 215 lb 11.2 oz (97.8 kg) Height: 5' 10\" (1.778 m) General  alert, cooperative, no distress, appears stated age Head  Normocephalic, without obvious abnormality, atraumatic Eyes  conjunctivae/corneas clear. PERRL. Fundi benign Ears  Canals asa cerumen. Nose Passages patent. Mucosa normal. No drainage or sinus tenderness. Throat Lips, mucosa, and tongue normal. Teeth and gums normal.  Post pharynx neg. Neck supple, nontender, no adenopathy, thyroid: not enlarged, no masses/nodules, no carotid bruits Back   symmetric, no curvature. Dec ROM with sxs all ranges. No CVA tenderness Lungs   clear to auscultation bilaterally Chest wall  no tenderness Heart  regular rate and rhythm, no murmur, click, rub or gallop Abdomen   soft, subxiphoid tenderness. Bowel sounds normal. No masses,  No organomegaly Genitalia  Testes descended bilat w/o masses. No hernias Rectal  Normal tone, normal prostate, no masses or tenderness Extremities extremities normal, atraumatic, no cyanosis or edema Pulses 1-2+ and symmetric Skin No rashes or lesions Neurologic Romberg neg, nml heel, toe and Tandem gait. DTRs 1-2+ symmetric ASSESSMENT and PLAN 
  ICD-10-CM ICD-9-CM 1. Physical exam Z00.00 V70.9 2. Gastroesophageal reflux disease, esophagitis presence not specified K21.9 530.81 REFERRAL TO GASTROENTEROLOGY 3. Abdominal pain, unspecified abdominal location R10.9 789.00 REFERRAL TO GASTROENTEROLOGY 4. Non-intractable vomiting with nausea, unspecified vomiting type R11.2 787.01 REFERRAL TO GASTROENTEROLOGY 5. Chronic obstructive pulmonary disease, unspecified [facility] J44.9 496 6. Chronic congestive heart failure, unspecified heart failure type (HCC) I50.9 428.0 7. Chronic bilateral low back pain without sciatica M54.5 724.2 G89.29 338.29   
8. Cigarette smoker F17.210 305.1 9. Depression, major, recurrent, moderate (HCC) F33.1 296.32 Follow-up Disposition: 
Return in about 1 year (around 2/14/2020) for [facility], [facility].

## 2019-02-14 NOTE — PROGRESS NOTES
[facility]  Identified pt with two pt identifiers(name and ). Chief Complaint Patient presents with  Complete Physical  
 Results 1. Have you been to the ER, urgent care clinic since your last visit? Hospitalized since your last visit? No 
 
2. Have you seen or consulted any other health care providers outside of the [facility] since your last visit? Include any pap smears or colon screening. No 
 
 
Would you like to sign up for MyChart today, if you have not already done so? No 
If not, would you like information on MyChart, and how to sign up at a later time? No 
 
 
Medication reconciliation up to date and corrected with patient at this time. Today's provider has been notified of reason for visit, vitals and flowsheets obtained on patients. Reviewed record in preparation for visit, huddled with provider and have obtained necessary documentation. Health Maintenance Due Topic  DTaP/Tdap/Td series (1 - Tdap)  FOBT Q 1 YEAR AGE 50-75 Wt Readings from Last 3 Encounters:  
19 215 lb 11.2 oz (97.8 kg) 18 210 lb 1.6 oz (95.3 kg) 17 208 lb (94.3 kg) Temp Readings from Last 3 Encounters:  
19 97.6 °F (36.4 °C) (Oral) 18 97.3 °F (36.3 °C) (Oral) 17 97.7 °F (36.5 °C) BP Readings from Last 3 Encounters:  
19 107/71  
18 111/76  
17 95/55 Pulse Readings from Last 3 Encounters:  
19 70  
18 69  
17 (!) 59 Vitals:  
 19 1118 BP: 107/71 Pulse: 70 Resp: 22 Temp: 97.6 °F (36.4 °C) TempSrc: Oral  
SpO2: 95% Weight: 215 lb 11.2 oz (97.8 kg) Height: 5' 10\" (1.778 m) PainSc:   7 PainLoc: Abdomen Learning Assessment: 
:  
 
Learning Assessment 2018 PRIMARY LEARNER Patient PRIMARY LANGUAGE ENGLISH  
LEARNER PREFERENCE PRIMARY OTHER (COMMENT) ANSWERED BY PATIENT  
RELATIONSHIP SELF Depression Screening: 
:  
 
 3 most recent PHQ Screens 2/14/2019 PHQ Not Done - Little interest or pleasure in doing things Nearly every day Feeling down, depressed, irritable, or hopeless Nearly every day Total Score PHQ 2 6 Trouble falling or staying asleep, or sleeping too much Nearly every day Feeling tired or having little energy Nearly every day Poor appetite, weight loss, or overeating Not at all Feeling bad about yourself - or that you are a failure or have let yourself or your family down Nearly every day Trouble concentrating on things such as school, work, reading, or watching TV More than half the days Moving or speaking so slowly that other people could have noticed; or the opposite being so fidgety that others notice Not at all Thoughts of being better off dead, or hurting yourself in some way More than half the days PHQ 9 Score 19 How difficult have these problems made it for you to do your work, take care of your home and get along with others Extremely difficult Fall Risk Assessment: 
:  
 
Fall Risk Assessment, last 12 mths 2/14/2019 Able to walk? Yes Fall in past 12 months? No  
 
 
Abuse Screening: 
:  
 
Abuse Screening Questionnaire 2/14/2019 11/8/2018 Do you ever feel afraid of your partner? Laquita Carroll Are you in a relationship with someone who physically or mentally threatens you? Laquita Carroll Is it safe for you to go home? Y Y  
 
 
ADL Screening: 
:  
 

## 2019-09-18 RX ORDER — CITALOPRAM 20 MG/1
40 TABLET, FILM COATED ORAL DAILY
COMMUNITY

## 2019-09-18 RX ORDER — DICYCLOMINE HYDROCHLORIDE 10 MG/1
10 CAPSULE ORAL 2 TIMES DAILY
COMMUNITY

## 2019-09-18 RX ORDER — RANITIDINE 150 MG/1
150 TABLET, FILM COATED ORAL DAILY
Status: ON HOLD | COMMUNITY
End: 2020-06-23

## 2019-09-18 RX ORDER — FLUTICASONE PROPIONATE 50 MCG
1 SPRAY, SUSPENSION (ML) NASAL
COMMUNITY

## 2019-09-19 ENCOUNTER — ANESTHESIA EVENT (OUTPATIENT)
Dept: ENDOSCOPY | Age: 59
End: 2019-09-19
Payer: MEDICAID

## 2019-09-19 ENCOUNTER — HOSPITAL ENCOUNTER (OUTPATIENT)
Age: 59
Setting detail: OUTPATIENT SURGERY
Discharge: HOME | End: 2019-09-19
Attending: INTERNAL MEDICINE | Admitting: INTERNAL MEDICINE
Payer: MEDICAID

## 2019-09-19 ENCOUNTER — ANESTHESIA (OUTPATIENT)
Dept: ENDOSCOPY | Age: 59
End: 2019-09-19
Payer: MEDICAID

## 2019-09-19 VITALS
BODY MASS INDEX: 30.02 KG/M2 | HEART RATE: 61 BPM | TEMPERATURE: 97.8 F | WEIGHT: 209.19 LBS | RESPIRATION RATE: 16 BRPM | DIASTOLIC BLOOD PRESSURE: 70 MMHG | OXYGEN SATURATION: 99 % | SYSTOLIC BLOOD PRESSURE: 116 MMHG

## 2019-09-19 PROCEDURE — 77030021593 HC FCPS BIOP ENDOSC BSC -A: Performed by: INTERNAL MEDICINE

## 2019-09-19 PROCEDURE — 76040000019: Performed by: INTERNAL MEDICINE

## 2019-09-19 PROCEDURE — 88305 TISSUE EXAM BY PATHOLOGIST: CPT

## 2019-09-19 PROCEDURE — 74011000250 HC RX REV CODE- 250: Performed by: NURSE ANESTHETIST, CERTIFIED REGISTERED

## 2019-09-19 PROCEDURE — 76060000031 HC ANESTHESIA FIRST 0.5 HR: Performed by: INTERNAL MEDICINE

## 2019-09-19 PROCEDURE — 74011250636 HC RX REV CODE- 250/636: Performed by: NURSE ANESTHETIST, CERTIFIED REGISTERED

## 2019-09-19 RX ORDER — SODIUM CHLORIDE 0.9 % (FLUSH) 0.9 %
5-40 SYRINGE (ML) INJECTION EVERY 8 HOURS
Status: DISCONTINUED | OUTPATIENT
Start: 2019-09-19 | End: 2019-09-19 | Stop reason: HOSPADM

## 2019-09-19 RX ORDER — SODIUM CHLORIDE 0.9 % (FLUSH) 0.9 %
5-40 SYRINGE (ML) INJECTION AS NEEDED
Status: DISCONTINUED | OUTPATIENT
Start: 2019-09-19 | End: 2019-09-19 | Stop reason: HOSPADM

## 2019-09-19 RX ORDER — FLUMAZENIL 0.1 MG/ML
0.2 INJECTION INTRAVENOUS
Status: DISCONTINUED | OUTPATIENT
Start: 2019-09-19 | End: 2019-09-19 | Stop reason: HOSPADM

## 2019-09-19 RX ORDER — EPINEPHRINE 0.1 MG/ML
1 INJECTION INTRACARDIAC; INTRAVENOUS
Status: DISCONTINUED | OUTPATIENT
Start: 2019-09-19 | End: 2019-09-19 | Stop reason: HOSPADM

## 2019-09-19 RX ORDER — PROPOFOL 10 MG/ML
INJECTION, EMULSION INTRAVENOUS AS NEEDED
Status: DISCONTINUED | OUTPATIENT
Start: 2019-09-19 | End: 2019-09-19 | Stop reason: HOSPADM

## 2019-09-19 RX ORDER — DEXTROMETHORPHAN/PSEUDOEPHED 2.5-7.5/.8
1.2 DROPS ORAL
Status: DISCONTINUED | OUTPATIENT
Start: 2019-09-19 | End: 2019-09-19 | Stop reason: HOSPADM

## 2019-09-19 RX ORDER — NALOXONE HYDROCHLORIDE 0.4 MG/ML
0.4 INJECTION, SOLUTION INTRAMUSCULAR; INTRAVENOUS; SUBCUTANEOUS
Status: DISCONTINUED | OUTPATIENT
Start: 2019-09-19 | End: 2019-09-19 | Stop reason: HOSPADM

## 2019-09-19 RX ORDER — LIDOCAINE HYDROCHLORIDE 20 MG/ML
INJECTION, SOLUTION EPIDURAL; INFILTRATION; INTRACAUDAL; PERINEURAL AS NEEDED
Status: DISCONTINUED | OUTPATIENT
Start: 2019-09-19 | End: 2019-09-19 | Stop reason: HOSPADM

## 2019-09-19 RX ORDER — SODIUM CHLORIDE 9 MG/ML
50 INJECTION, SOLUTION INTRAVENOUS CONTINUOUS
Status: DISCONTINUED | OUTPATIENT
Start: 2019-09-19 | End: 2019-09-19 | Stop reason: HOSPADM

## 2019-09-19 RX ORDER — ATROPINE SULFATE 0.1 MG/ML
0.5 INJECTION INTRAVENOUS
Status: DISCONTINUED | OUTPATIENT
Start: 2019-09-19 | End: 2019-09-19 | Stop reason: HOSPADM

## 2019-09-19 RX ADMIN — PROPOFOL 100 MG: 10 INJECTION, EMULSION INTRAVENOUS at 13:18

## 2019-09-19 RX ADMIN — LIDOCAINE HYDROCHLORIDE 100 MG: 20 INJECTION, SOLUTION EPIDURAL; INFILTRATION; INTRACAUDAL; PERINEURAL at 13:18

## 2019-09-19 RX ADMIN — PROPOFOL 50 MG: 10 INJECTION, EMULSION INTRAVENOUS at 13:20

## 2019-09-19 RX ADMIN — PROPOFOL 100 MG: 10 INJECTION, EMULSION INTRAVENOUS at 13:19

## 2019-09-19 NOTE — ANESTHESIA POSTPROCEDURE EVALUATION
Post-Anesthesia Evaluation and Assessment    Patient: Cheli Jones MRN: [id number]  SSN: [id number]    YOB: 1960  Age: 62 y.o. Sex: male      I have evaluated the patient and they are stable and ready for discharge from the PACU. Cardiovascular Function/Vital Signs  Visit Vitals  /73   Pulse 67   Temp 36.8 °C (98.3 °F)   Resp 16   Wt 94.9 kg (209 lb 3 oz)   SpO2 95%   BMI 30.02 kg/m²       Patient is status post MAC anesthesia for Procedure(s):  ESOPHAGOGASTRODUODENOSCOPY (EGD)  ESOPHAGOGASTRODUODENAL (EGD) BIOPSY. Nausea/Vomiting: None    Postoperative hydration reviewed and adequate. Pain:  Pain Scale 1: Numeric (0 - 10) (09/19/19 1337)  Pain Intensity 1: 0 (09/19/19 1337)   Managed    Neurological Status: At baseline    Mental Status, Level of Consciousness: Alert and  oriented to person, place, and time    Pulmonary Status:   O2 Device: Nasal cannula (09/19/19 1337)   Adequate oxygenation and airway patent    Complications related to anesthesia: None    Post-anesthesia assessment completed. No concerns    Signed By: Mariusz Mitchell MD     September 19, 2019              Procedure(s):  ESOPHAGOGASTRODUODENOSCOPY (EGD)  ESOPHAGOGASTRODUODENAL (EGD) BIOPSY. MAC    <BSHSIANPOST>    Vitals Value Taken Time   BP 87/47 9/19/2019  1:40 PM   Temp     Pulse 68 9/19/2019  1:41 PM   Resp 0 9/19/2019  1:41 PM   SpO2 97 % 9/19/2019  1:41 PM   Vitals shown include unvalidated device data.

## 2019-09-19 NOTE — ANESTHESIA PREPROCEDURE EVALUATION
Relevant Problems   No relevant active problems       Anesthetic History     PONV          Review of Systems / Medical History  Patient summary reviewed, nursing notes reviewed and pertinent labs reviewed    Pulmonary    COPD      Smoker  Asthma        Neuro/Psych         Psychiatric history     Cardiovascular              CAD and cardiac stents    Exercise tolerance: <4 METS  Comments: EF 30% per patient   GI/Hepatic/Renal     GERD: poorly controlled           Endo/Other        Arthritis     Other Findings              Physical Exam    Airway  Mallampati: III  TM Distance: 4 - 6 cm  Neck ROM: normal range of motion   Mouth opening: Normal     Cardiovascular    Rhythm: regular  Rate: normal         Dental    Dentition: Upper dentition intact and Lower dentition intact     Pulmonary  Breath sounds clear to auscultation               Abdominal  GI exam deferred       Other Findings            Anesthetic Plan    ASA: 3  Anesthesia type: MAC          Induction: Intravenous  Anesthetic plan and risks discussed with: Patient

## 2019-09-19 NOTE — DISCHARGE INSTRUCTIONS
[address] [place]  [id number]  1960    EGD DISCHARGE INSTRUCTIONS    Discomfort:  Sore throat- throat lozenges or warm salt water gargle  redness at IV site- apply warm compress to area; if redness or soreness persist- contact your physician  Gaseous discomfort- walking, belching will help relieve any discomfort  You may not operate a vehicle for 12 hours  You may not engage in an occupation involving machinery or appliances for rest of today  You may not drink alcoholic beverages for at least 12 hours  Avoid making any critical decisions for at least 24 hour  DIET  You may have anything by mouth- do not eat or drink for two hours. You may eat and drink after you leave. You may resume your regular diet - however -  remember your colon is empty and a heavy meal will produce gas. Avoid these foods:  vegetables, fried / greasy foods, carbonated drinks        ACTIVITY  You may resume your normal daily activities   Spend the remainder of the day resting -  avoid any strenuous activity. CALL M.D. ANY SIGN OF   Increasing pain, nausea, vomiting  Abdominal distension (swelling)  New increased bleeding (oral or rectal)  Fever (chills)  Pain in chest area  Bloody discharge from nose or mouth  Shortness of breath    Follow-up Instructions:   Call Elizabeth Desai for any questions or problems. Telephone # [phone]  Biopsy results available  in  5 to 7 days. We will call you or send a letter   Continue same medications. Impression:  erosive gastritis, POSSIBLE BARRETTS ESOPHAGUS    Yelena Roca MD  9/19/2019  1:32 PM  Patient Education        Gastritis: Care Instructions  Your Care Instructions    Gastritis is a sore and upset stomach. It happens when something irritates the stomach lining. Many things can cause it. These include an infection such as the flu or something you ate or drank.  Medicines or a sore on the lining of the stomach (ulcer) also can cause it. Your belly may bloat and ache. You may belch, vomit, and feel sick to your stomach. You should be able to relieve the problem by taking medicine. And it may help to change your diet. If gastritis lasts, your doctor may prescribe medicine. Follow-up care is a key part of your treatment and safety. Be sure to make and go to all appointments, and call your doctor if you are having problems. It's also a good idea to know your test results and keep a list of the medicines you take. How can you care for yourself at home? · If your doctor prescribed antibiotics, take them as directed. Do not stop taking them just because you feel better. You need to take the full course of antibiotics. · Be safe with medicines. If your doctor prescribed medicine to decrease stomach acid, take it as directed. Call your doctor if you think you are having a problem with your medicine. · Do not take any other medicine, including over-the-counter pain relievers, without talking to your doctor first.  · If your doctor recommends over-the-counter medicine to reduce stomach acid, such as Pepcid AC, Prilosec, Tagamet HB, or Zantac 75, follow the directions on the label. · Drink plenty of fluids (enough so that your urine is light yellow or clear like water) to prevent dehydration. Choose water and other caffeine-free clear liquids. If you have kidney, heart, or liver disease and have to limit fluids, talk with your doctor before you increase the amount of fluids you drink. · Limit how much alcohol you drink. · Avoid coffee, tea, cola drinks, chocolate, and other foods with caffeine. They increase stomach acid. When should you call for help? Call 911 anytime you think you may need emergency care.  For example, call if:    · You vomit blood or what looks like coffee grounds.     · You pass maroon or very bloody stools.    Call your doctor now or seek immediate medical care if:    · You start breathing fast and have not produced urine in the last 8 hours.     · You cannot keep fluids down.    Watch closely for changes in your health, and be sure to contact your doctor if:    · You do not get better as expected. Where can you learn more? Go to http://noe-blanka.info/. Enter [id number] in the search box to learn more about \"Gastritis: Care Instructions. \"  Current as of: November 7, 2018  Content Version: 12.1  © 0052-2747 [employer]. Care instructions adapted under license by [employer] (which disclaims liability or warranty for this information). If you have questions about a medical condition or this instruction, always ask your healthcare professional. [address] any warranty or liability for your use of this information.

## 2019-09-19 NOTE — PROCEDURES
[address]  [address] [facility], [address] [place] [address]                  :  Monica Carl MD    Surgical Assistant: None    Implants: None    Referring Provider: Chase Lincoln MD    Sedation:  MAC anesthesia Propofol      Prior to the procedure its objectives, risks, consequences and alternatives were discussed with the patient who then elected to proceed. The patient had the opportunity to ask questions and those questions were answered. A physical exam was performed. The heart, lungs, and mental status were examined prior to the procedure and found to be satisfactory for conscious sedation and for the procedure. Conscious sedation was initiated by the physician. Continuous pulse oximetry and blood pressure monitoring were used throughout the procedure. After appropriate pharyngeal anesthesia, the endoscope was passed into the esophagus without difficulty. The proximal esophagus is normal. In the distal esophagus there appears to be short segment cabrera's esophagus. Multiple biopsies were obtained. The scope was then passed into the stomach without difficulty. The fundus is normal.  In the body and antrum there is moderate gastritis. The pylorus, bulb and postbulbar area are unremarkable. A biopsy was obtained for surgical path from the gastric antrum and body. On slow withdrawal of the scope, retroflexion confirmed a normal cardia and fundus. He tolerated the procedure without complications and will be discharged later today. Specimen:  Biopsies from distal esophagus and from antrum    Complications: None. EBL:  None.     Monica Carl MD  9/19/2019 1:28 PM

## 2019-09-19 NOTE — PROGRESS NOTES
Care of patient assumed from the anesthesia provider. Patient tolerated procedure well. Abdomen remains soft and non tender post procedure, no complaints or indication of discomfort noted at this time. See anesthesia note.
Endoscope was pre-cleaned at bedside immediately following procedure by Lisa Adam. Tyra Hamilton
Patient will be monitored and sedated by anesthesia for their procedure. See anesthesia note.
Class II - visualization of the soft palate, fauces, and uvula

## 2019-09-19 NOTE — H&P
[address]  [place], [address]                 Luke Charles is a  62 y.o.  male who presents with epigastric pain, nausea and vomiting . Past Medical History:   Diagnosis Date    Adverse effect of anesthesia     \"HEART STOPPED\" DURING ACHILLES TENDON REPAIR - WAS TOLD HE NEEDED TO GO SEE HIS HEART DOCTOR    Arthritis     osteo-- primarily affecting back, hands    Asthma     last exacerbation 1 yr ago. Uses albuterol rarely only. Never hospitalized    Chronic obstructive pulmonary disease (HCC)     Chronic pain     BACK/HIPS    Depression     Eczema     GERD (gastroesophageal reflux disease)     occasional only    Heart [facility]) 2006    Dr Kathy Weber;  stents x2.  Last stress test 2009--WNL    Hypercholesteremia     IBS (irritable bowel syndrome)     Ill-defined condition     OBESE PER PT    Ill-defined condition     ATOPIC DERMATITIS     Past Surgical History:   Procedure Laterality Date    CARDIAC SURG PROCEDURE UNLIST  2006    stent placed s/p MI    HX ORTHOPAEDIC  2008    Rt knee reconstruction    HX ORTHOPAEDIC      RT ACHILLES TENDON REPAIR    HX OTHER SURGICAL      rectal surgery    HX TONSILLECTOMY       Allergies   Allergen Reactions    Lisinopril Other (comments)     Elevated K+    Codeine Nausea and Vomiting    Hydrocodone Other (comments)     hypotension     Current Facility-Administered Medications   Medication Dose Route Frequency Provider Last Rate Last Dose    0.9% sodium chloride infusion  50 mL/hr IntraVENous CONTINUOUS Brigette Smith MD        sodium chloride (NS) flush 5-40 mL  5-40 mL IntraVENous Q8H Brigette Smith MD        sodium chloride (NS) flush 5-40 mL  5-40 mL IntraVENous PRN Brigette Smith MD        naloxone [facility]) injection 0.4 mg  0.4 mg IntraVENous Radha Smith MD        flumazenil (ROMAZICON) 0.1 mg/mL injection 0.2 mg  0.2 mg IntraVENous Radha Smiht MD  simethicone (MYLICON) 22GL/6.0PR oral drops 80 mg  1.2 mL Oral Multiple Amie Carl MD        atropine injection 0.5 mg  0.5 mg IntraVENous ONCE PRN Amie Carl MD        EPINEPHrine (ADRENALIN) 0.1 mg/mL syringe 1 mg  1 mg Endoscopically ONCE PRN Amie Carl MD           Visit Vitals  /79   Pulse 66   Temp 98.3 °F (36.8 °C)   Resp 21   Wt 94.9 kg (209 lb 3 oz)   SpO2 99%   BMI 30.02 kg/m²           PHYSICAL EXAM:  General: WD, WN. Alert, cooperative, no acute distress    HEENT: NC, Atraumatic. PERRLA, EOMI. Anicteric sclerae. Mallampati score 2  Lungs:  CTA Bilaterally. No Wheezing/Rhonchi/Rales. Heart:  Regular  rhythm,  No murmur (), No Rubs, No Gallops  Abdomen: Soft, Non distended, Non tender.  +Bowel sounds, no HSM  Extremities: No c/c/e  Neurologic:  CN 2-12 gi, Alert and oriented X 3. No acute neurological distress   Psych:   Good insight. Not anxious nor agitated. Plan:   Endoscopic procedure with MAC.     Marcin Montalvo MD  9/19/2019  1:17 PM

## 2019-11-06 ENCOUNTER — APPOINTMENT (OUTPATIENT)
Dept: CT IMAGING | Age: 59
End: 2019-11-06
Attending: EMERGENCY MEDICINE
Payer: MEDICAID

## 2019-11-06 ENCOUNTER — HOSPITAL ENCOUNTER (EMERGENCY)
Age: 59
Discharge: HOME | End: 2019-11-06
Attending: EMERGENCY MEDICINE | Admitting: EMERGENCY MEDICINE
Payer: MEDICAID

## 2019-11-06 VITALS
RESPIRATION RATE: 14 BRPM | HEART RATE: 61 BPM | DIASTOLIC BLOOD PRESSURE: 58 MMHG | OXYGEN SATURATION: 94 % | TEMPERATURE: 98.3 F | SYSTOLIC BLOOD PRESSURE: 95 MMHG

## 2019-11-06 DIAGNOSIS — R42 VERTIGO: Primary | ICD-10-CM

## 2019-11-06 LAB
ALBUMIN SERPL-MCNC: 3.4 G/DL (ref 3.5–5)
ALBUMIN/GLOB SERPL: 1.1 {RATIO} (ref 1.1–2.2)
ALP SERPL-CCNC: 74 U/L (ref 45–117)
ALT SERPL-CCNC: 19 U/L (ref 12–78)
ANION GAP SERPL CALC-SCNC: 4 MMOL/L (ref 5–15)
AST SERPL-CCNC: 9 U/L (ref 15–37)
ATRIAL RATE: 60 BPM
BASOPHILS # BLD: 0.1 K/UL (ref 0–0.1)
BASOPHILS NFR BLD: 1 % (ref 0–1)
BILIRUB SERPL-MCNC: 0.6 MG/DL (ref 0.2–1)
BUN SERPL-MCNC: 18 MG/DL (ref 6–20)
BUN/CREAT SERPL: 17 (ref 12–20)
CALCIUM SERPL-MCNC: 8.5 MG/DL (ref 8.5–10.1)
CALCULATED R AXIS, ECG10: 22 DEGREES
CALCULATED T AXIS, ECG11: 41 DEGREES
CHLORIDE SERPL-SCNC: 107 MMOL/L (ref 97–108)
CO2 SERPL-SCNC: 27 MMOL/L (ref 21–32)
COMMENT, HOLDF: NORMAL
COMMENT, HOLDF: NORMAL
CREAT SERPL-MCNC: 1.06 MG/DL (ref 0.7–1.3)
DIAGNOSIS, 93000: NORMAL
DIFFERENTIAL METHOD BLD: ABNORMAL
EOSINOPHIL # BLD: 0.5 K/UL (ref 0–0.4)
EOSINOPHIL NFR BLD: 5 % (ref 0–7)
ERYTHROCYTE [DISTWIDTH] IN BLOOD BY AUTOMATED COUNT: 12.7 % (ref 11.5–14.5)
GLOBULIN SER CALC-MCNC: 3.1 G/DL (ref 2–4)
GLUCOSE SERPL-MCNC: 140 MG/DL (ref 65–100)
HCT VFR BLD AUTO: 45.9 % (ref 36.6–50.3)
HGB BLD-MCNC: 14.9 G/DL (ref 12.1–17)
IMM GRANULOCYTES # BLD AUTO: 0 K/UL (ref 0–0.04)
IMM GRANULOCYTES NFR BLD AUTO: 0 % (ref 0–0.5)
LYMPHOCYTES # BLD: 2.4 K/UL (ref 0.8–3.5)
LYMPHOCYTES NFR BLD: 25 % (ref 12–49)
MCH RBC QN AUTO: 30.4 PG (ref 26–34)
MCHC RBC AUTO-ENTMCNC: 32.5 G/DL (ref 30–36.5)
MCV RBC AUTO: 93.7 FL (ref 80–99)
MONOCYTES # BLD: 0.8 K/UL (ref 0–1)
MONOCYTES NFR BLD: 9 % (ref 5–13)
NEUTS SEG # BLD: 5.8 K/UL (ref 1.8–8)
NEUTS SEG NFR BLD: 60 % (ref 32–75)
NRBC # BLD: 0 K/UL (ref 0–0.01)
NRBC BLD-RTO: 0 PER 100 WBC
PLATELET # BLD AUTO: 264 K/UL (ref 150–400)
PMV BLD AUTO: 9.4 FL (ref 8.9–12.9)
POTASSIUM SERPL-SCNC: 4.2 MMOL/L (ref 3.5–5.1)
PROT SERPL-MCNC: 6.5 G/DL (ref 6.4–8.2)
Q-T INTERVAL, ECG07: 438 MS
QRS DURATION, ECG06: 100 MS
QTC CALCULATION (BEZET), ECG08: 444 MS
RBC # BLD AUTO: 4.9 M/UL (ref 4.1–5.7)
SAMPLES BEING HELD,HOLD: NORMAL
SAMPLES BEING HELD,HOLD: NORMAL
SODIUM SERPL-SCNC: 138 MMOL/L (ref 136–145)
TROPONIN I SERPL-MCNC: <0.05 NG/ML
TROPONIN I SERPL-MCNC: <0.05 NG/ML
VENTRICULAR RATE, ECG03: 62 BPM
WBC # BLD AUTO: 9.6 K/UL (ref 4.1–11.1)

## 2019-11-06 PROCEDURE — 36415 COLL VENOUS BLD VENIPUNCTURE: CPT

## 2019-11-06 PROCEDURE — 84484 ASSAY OF TROPONIN QUANT: CPT

## 2019-11-06 PROCEDURE — 80053 COMPREHEN METABOLIC PANEL: CPT

## 2019-11-06 PROCEDURE — 70450 CT HEAD/BRAIN W/O DYE: CPT

## 2019-11-06 PROCEDURE — 74011636320 HC RX REV CODE- 636/320: Performed by: RADIOLOGY

## 2019-11-06 PROCEDURE — 74011250636 HC RX REV CODE- 250/636: Performed by: EMERGENCY MEDICINE

## 2019-11-06 PROCEDURE — 93005 ELECTROCARDIOGRAM TRACING: CPT

## 2019-11-06 PROCEDURE — 99285 EMERGENCY DEPT VISIT HI MDM: CPT

## 2019-11-06 PROCEDURE — 96374 THER/PROPH/DIAG INJ IV PUSH: CPT

## 2019-11-06 PROCEDURE — 85025 COMPLETE CBC W/AUTO DIFF WBC: CPT

## 2019-11-06 PROCEDURE — 70496 CT ANGIOGRAPHY HEAD: CPT

## 2019-11-06 PROCEDURE — 74011000258 HC RX REV CODE- 258: Performed by: RADIOLOGY

## 2019-11-06 PROCEDURE — 70498 CT ANGIOGRAPHY NECK: CPT

## 2019-11-06 RX ORDER — MECLIZINE HYDROCHLORIDE 25 MG/1
25 TABLET ORAL
Qty: 15 TAB | Refills: 0 | Status: SHIPPED | OUTPATIENT
Start: 2019-11-06 | End: 2019-11-16

## 2019-11-06 RX ORDER — MECLIZINE HCL 12.5 MG 12.5 MG/1
25 TABLET ORAL
Status: COMPLETED | OUTPATIENT
Start: 2019-11-06 | End: 2019-11-06

## 2019-11-06 RX ORDER — ONDANSETRON 2 MG/ML
8 INJECTION INTRAMUSCULAR; INTRAVENOUS
Status: COMPLETED | OUTPATIENT
Start: 2019-11-06 | End: 2019-11-06

## 2019-11-06 RX ORDER — ONDANSETRON 8 MG/1
8 TABLET, ORALLY DISINTEGRATING ORAL
Qty: 8 TAB | Refills: 0 | Status: SHIPPED | OUTPATIENT
Start: 2019-11-06

## 2019-11-06 RX ORDER — SODIUM CHLORIDE 0.9 % (FLUSH) 0.9 %
10 SYRINGE (ML) INJECTION
Status: COMPLETED | OUTPATIENT
Start: 2019-11-06 | End: 2019-11-06

## 2019-11-06 RX ADMIN — MECLIZINE 25 MG: 12.5 TABLET ORAL at 12:27

## 2019-11-06 RX ADMIN — IOPAMIDOL 100 ML: 755 INJECTION, SOLUTION INTRAVENOUS at 13:04

## 2019-11-06 RX ADMIN — ONDANSETRON 8 MG: 2 INJECTION INTRAMUSCULAR; INTRAVENOUS at 12:27

## 2019-11-06 RX ADMIN — Medication 10 ML: at 13:04

## 2019-11-06 RX ADMIN — SODIUM CHLORIDE 100 ML: 900 INJECTION, SOLUTION INTRAVENOUS at 13:04

## 2019-11-06 NOTE — DISCHARGE INSTRUCTIONS
Patient Education        Vertigo: Care Instructions  Your Care Instructions    Vertigo is the feeling that you or your surroundings are moving when there is no actual movement. It is often described as a feeling of spinning, whirling, falling, or tilting. Vertigo may make you vomit or feel nauseated. You may have trouble standing or walking and may lose your balance. Vertigo is often related to an inner ear problem, but it can have other more serious causes. If vertigo continues, you may need more tests to find its cause. Follow-up care is a key part of your treatment and safety. Be sure to make and go to all appointments, and call your doctor if you are having problems. It's also a good idea to know your test results and keep a list of the medicines you take. How can you care for yourself at home? · Do not lie flat on your back. Prop yourself up slightly. This may reduce the spinning feeling. Keep your eyes open. · Move slowly so that you do not fall. · If your doctor recommends medicine, take it exactly as directed. · Do not drive while you are having vertigo. Certain exercises, called Rivera-Daroff exercises, can help decrease vertigo. To do Rivera-Daroff exercises:  · Sit on the edge of a bed or sofa and quickly lie down on the side that causes the worst vertigo. Lie on your side with your ear down. · Stay in this position for at least 30 seconds or until the vertigo goes away. · Sit up. If this causes vertigo, wait for it to stop. · Repeat the procedure on the other side. · Repeat this 10 times. Do these exercises 2 times a day until the vertigo is gone. When should you call for help? Call 911 anytime you think you may need emergency care. For example, call if:    · You passed out (lost consciousness).     · You have symptoms of a stroke. These may include:  ? Sudden numbness, tingling, weakness, or loss of movement in your face, arm, or leg, especially on only one side of your body.   ? Sudden vision changes. ? Sudden trouble speaking. ? Sudden confusion or trouble understanding simple statements. ? Sudden problems with walking or balance. ? A sudden, severe headache that is different from past headaches.    Call your doctor now or seek immediate medical care if:    · Vertigo occurs with a fever, a headache, or ringing in your ears.     · You have new or increased nausea and vomiting.    Watch closely for changes in your health, and be sure to contact your doctor if:    · Vertigo gets worse or happens more often.     · Vertigo has not gotten better after 2 weeks. Where can you learn more? Go to http://noe-blanka.info/. Enter X848 in the search box to learn more about \"Vertigo: Care Instructions. \"  Current as of: October 21, 2018  Content Version: 12.2  © 8258-3701 [employer]. Care instructions adapted under license by [employer] (which disclaims liability or warranty for this information). If you have questions about a medical condition or this instruction, always ask your healthcare professional. [place] [address] any warranty or liability for your use of this information. Home to take your medications as directed. Rest.  Follow-up with your own physician in 3 to 4 days if no improvement in symptoms.

## 2019-11-06 NOTE — ED NOTES
MD reviewed discharge instructions and options with patient and patient verbalized understanding. RN reviewed discharge instructions using teachback method. Pt ambulated to exit without difficulty and in no signs of acute distress escorted by his wife, and she  will drive home. No complaints or needs expressed at this time. Patient was counseled on medications prescribed at discharge. VSS, verbalized relief from most intense pain. Patient to call Dr. Nito Judd in the morning for appointment.

## 2019-11-06 NOTE — ED PROVIDER NOTES
This is a 59-year with a history of chronic obstructive pulmonary disease and MI in the past.  He has had several episodes of intermittent dizziness in the past.  He states that today there was a sudden onset of severe vertiginous-like dizziness which occurred while he was sitting down. This began about an hour prior to getting to. He had some nausea he has a history of irritable bowel syndrome and states that he was having difficulty with this bowels today. There was some question as to whether or not he may be somewhat dehydrated. He has had no fever or chills, sweats, chest pain or shortness of breath. The dizziness is somewhat better at this time. However, the patient is complaining of some worsening of symptoms as he moves his head and opens his eyes. Past Medical History:   Diagnosis Date    Adverse effect of anesthesia     \"HEART STOPPED\" DURING ACHILLES TENDON REPAIR - WAS TOLD HE NEEDED TO GO SEE HIS HEART DOCTOR    Arthritis     osteo-- primarily affecting back, hands    Asthma     last exacerbation 1 yr ago. Uses albuterol rarely only. Never hospitalized    Chronic obstructive pulmonary disease (HCC)     Chronic pain     BACK/HIPS    Depression     Eczema     GERD (gastroesophageal reflux disease)     occasional only    Heart attack [facility]) 2006    Dr Ballard Diss;  stents x2.  Last stress test 2009--WNL    Hypercholesteremia     IBS (irritable bowel syndrome)     Ill-defined condition     OBESE PER PT    Ill-defined condition     ATOPIC DERMATITIS       Past Surgical History:   Procedure Laterality Date    CARDIAC SURG PROCEDURE UNLIST  2006    stent placed s/p MI    HX ORTHOPAEDIC  2008    Rt knee reconstruction    HX ORTHOPAEDIC      RT ACHILLES TENDON REPAIR    HX OTHER SURGICAL      rectal surgery    HX TONSILLECTOMY           Family History:   Family history unknown: Yes       Social History     Socioeconomic History    Marital status: LEGALLY      Spouse name: Not on file    Number of children: Not on file    Years of education: Not on file    Highest education level: Not on file   Occupational History    Not on file   Social Needs    Financial resource strain: Not on file    Food insecurity:     Worry: Not on file     Inability: Not on file    Transportation needs:     Medical: Not on file     Non-medical: Not on file   Tobacco Use    Smoking status: Current Every Day Smoker     Packs/day: 0.50    Smokeless tobacco: Never Used   Substance and Sexual Activity    Alcohol use: No    Drug use: Yes     Types: Marijuana    Sexual activity: Yes     Partners: Female   Lifestyle    Physical activity:     Days per week: Not on file     Minutes per session: Not on file    Stress: Not on file   Relationships    Social connections:     Talks on phone: Not on file     Gets together: Not on file     Attends [employer] service: Not on file     Active member of club or organization: Not on file     Attends meetings of clubs or organizations: Not on file     Relationship status: Not on file    Intimate partner violence:     Fear of current or ex partner: Not on file     Emotionally abused: Not on file     Physically abused: Not on file     Forced sexual activity: Not on file   Other Topics Concern    Not on file   Social History Narrative    Not on file         ALLERGIES: Lisinopril; Codeine; and Hydrocodone    Review of Systems   Constitutional: Negative for activity change, appetite change and fatigue. HENT: Negative for ear pain, facial swelling, sore throat and trouble swallowing. Eyes: Negative for pain, discharge and visual disturbance. Respiratory: Negative for chest tightness, shortness of breath and wheezing. Cardiovascular: Negative for chest pain and palpitations. Gastrointestinal: Positive for abdominal pain, diarrhea, nausea and vomiting. Negative for blood in stool.    Genitourinary: Negative for difficulty urinating, flank pain and hematuria. Musculoskeletal: Negative for arthralgias, joint swelling, myalgias and neck pain. Skin: Negative for color change and rash. Neurological: Positive for dizziness. Negative for weakness, numbness and headaches. Hematological: Negative for adenopathy. Does not bruise/bleed easily. Psychiatric/Behavioral: Negative for behavioral problems, confusion and sleep disturbance. All other systems reviewed and are negative. Vitals:    11/06/19 1134   BP: 110/70   Resp: 12   Temp: 97.4 °F (36.3 °C)   SpO2: 93%            Physical Exam   Constitutional: He is oriented to person, place, and time. He appears well-developed and well-nourished. No distress. Patient is in moderate distress with dizziness. HENT:   Head: Normocephalic and atraumatic. Nose: Nose normal.   Mouth/Throat: Oropharynx is clear and moist.   No nystagmus. Eyes: Pupils are equal, round, and reactive to light. Conjunctivae and EOM are normal. No scleral icterus. Neck: Normal range of motion. Neck supple. No JVD present. No tracheal deviation present. No thyromegaly present. No carotid bruits noted. Cardiovascular: Normal rate, regular rhythm, normal heart sounds and intact distal pulses. Exam reveals no gallop and no friction rub. No murmur heard. Pulmonary/Chest: Effort normal and breath sounds normal. No respiratory distress. He has no wheezes. He has no rales. He exhibits no tenderness. Abdominal: Soft. Bowel sounds are normal. He exhibits no distension and no mass. There is no tenderness. There is no rebound and no guarding. Musculoskeletal: Normal range of motion. He exhibits no edema or tenderness. Lymphadenopathy:     He has no cervical adenopathy. Neurological: He is alert and oriented to person, place, and time. He has normal reflexes. No cranial nerve deficit. Coordination normal.   Skin: Skin is warm and dry. No rash noted. No erythema. Psychiatric: He has a normal mood and affect.  His behavior is normal. Judgment and thought content normal.   Nursing note and vitals reviewed. MDM  Number of Diagnoses or Management Options     Amount and/or Complexity of Data Reviewed  Clinical lab tests: reviewed and ordered  Tests in the radiology section of CPT®: reviewed and ordered  Decide to obtain previous medical records or to obtain history from someone other than the patient: yes  Independent visualization of images, tracings, or specimens: yes    Risk of Complications, Morbidity, and/or Mortality  Presenting problems: high  Diagnostic procedures: high  Management options: high    Patient Progress  Patient progress: stable         Procedures    The patient's CT, CTA and labs were all normal.  He is feeling much better after his medications. Suspect that this was an  episode of vertigo. And repeating the troponin and if negative will discharge home with Zofran ODT and Antivert. He will follow-up with his own physician if continued difficulty.

## 2019-11-06 NOTE — ED TRIAGE NOTES
Triage: Pt arrives via EMS from home, pt sitting on couch, pt started to feel dizzy, blurry vision, near syncopal episode. Continued dizziness but has improved slightly with IVF that EMS has given pt. /70, NSR with 1st degree block per EMS, .

## 2020-06-18 ENCOUNTER — OFFICE VISIT (OUTPATIENT)
Dept: PRIMARY CARE CLINIC | Age: 60
End: 2020-06-18

## 2020-06-18 VITALS — TEMPERATURE: 98 F | RESPIRATION RATE: 16 BRPM | HEART RATE: 68 BPM | OXYGEN SATURATION: 96 %

## 2020-06-18 DIAGNOSIS — Z01.818 PRE-OP EXAM: Primary | ICD-10-CM

## 2020-06-18 DIAGNOSIS — Z11.59 SPECIAL SCREENING EXAMINATION FOR UNSPECIFIED VIRAL DISEASE: ICD-10-CM

## 2020-06-18 NOTE — PROGRESS NOTES
Patient is being seen at the [address] [place] [address]. Please see scanned documentation as well for further information. S:  Mr. Hany Martínez presents for pre-op or pre-procedure testing for Covid 19. Patient has procedure or surgery by Dr. Kin Shone scheduled for 6/22/20. Patient denies current Covid type symptoms. O:    Visit Vitals  Pulse 68   Temp 98 °F (36.7 °C) (Oral)   Resp 16   SpO2 96%     Alert and oriented  No acute distress, no increased work of breathing  Normocephalic, atraumatic  Skin color normal  Calm and cooperative  Voice clear, conversant without shortness of breath    A/P:  Pre-op, Pre-procedure exam    Covid 19 testing performed  Patient understands he will be contacted if the results are positive. Follow up prn.

## 2020-06-21 LAB — SARS-COV-2, NAA: NOT DETECTED

## 2020-06-23 ENCOUNTER — HOSPITAL ENCOUNTER (OUTPATIENT)
Age: 60
Setting detail: OUTPATIENT SURGERY
Discharge: HOME | End: 2020-06-23
Attending: INTERNAL MEDICINE | Admitting: INTERNAL MEDICINE
Payer: MEDICAID

## 2020-06-23 VITALS
OXYGEN SATURATION: 94 % | SYSTOLIC BLOOD PRESSURE: 138 MMHG | WEIGHT: 215 LBS | HEIGHT: 69 IN | TEMPERATURE: 97.5 F | RESPIRATION RATE: 20 BRPM | BODY MASS INDEX: 31.84 KG/M2 | DIASTOLIC BLOOD PRESSURE: 97 MMHG | HEART RATE: 78 BPM

## 2020-06-23 DIAGNOSIS — R06.02 BREATH SHORTNESS: ICD-10-CM

## 2020-06-23 PROCEDURE — 74011250636 HC RX REV CODE- 250/636: Performed by: INTERNAL MEDICINE

## 2020-06-23 PROCEDURE — 99152 MOD SED SAME PHYS/QHP 5/>YRS: CPT | Performed by: INTERNAL MEDICINE

## 2020-06-23 PROCEDURE — 77030004532 HC CATH ANGI DX IMP BSC -A: Performed by: INTERNAL MEDICINE

## 2020-06-23 PROCEDURE — 74011000250 HC RX REV CODE- 250: Performed by: INTERNAL MEDICINE

## 2020-06-23 PROCEDURE — 93454 CORONARY ARTERY ANGIO S&I: CPT | Performed by: INTERNAL MEDICINE

## 2020-06-23 PROCEDURE — C1894 INTRO/SHEATH, NON-LASER: HCPCS | Performed by: INTERNAL MEDICINE

## 2020-06-23 PROCEDURE — C1769 GUIDE WIRE: HCPCS | Performed by: INTERNAL MEDICINE

## 2020-06-23 PROCEDURE — 77030013744: Performed by: INTERNAL MEDICINE

## 2020-06-23 PROCEDURE — C1760 CLOSURE DEV, VASC: HCPCS | Performed by: INTERNAL MEDICINE

## 2020-06-23 PROCEDURE — 77030019569 HC BND COMPR RAD TERU -B: Performed by: INTERNAL MEDICINE

## 2020-06-23 PROCEDURE — 74011636320 HC RX REV CODE- 636/320: Performed by: INTERNAL MEDICINE

## 2020-06-23 PROCEDURE — 99153 MOD SED SAME PHYS/QHP EA: CPT | Performed by: INTERNAL MEDICINE

## 2020-06-23 RX ORDER — VERAPAMIL HYDROCHLORIDE 2.5 MG/ML
INJECTION, SOLUTION INTRAVENOUS AS NEEDED
Status: DISCONTINUED | OUTPATIENT
Start: 2020-06-23 | End: 2020-06-23 | Stop reason: HOSPADM

## 2020-06-23 RX ORDER — SODIUM CHLORIDE 9 MG/ML
3 INJECTION, SOLUTION INTRAVENOUS CONTINUOUS
Status: DISPENSED | OUTPATIENT
Start: 2020-06-23 | End: 2020-06-23

## 2020-06-23 RX ORDER — DIPHENHYDRAMINE HYDROCHLORIDE 50 MG/ML
INJECTION, SOLUTION INTRAMUSCULAR; INTRAVENOUS AS NEEDED
Status: DISCONTINUED | OUTPATIENT
Start: 2020-06-23 | End: 2020-06-23 | Stop reason: HOSPADM

## 2020-06-23 RX ORDER — ONDANSETRON 2 MG/ML
INJECTION INTRAMUSCULAR; INTRAVENOUS AS NEEDED
Status: DISCONTINUED | OUTPATIENT
Start: 2020-06-23 | End: 2020-06-23 | Stop reason: HOSPADM

## 2020-06-23 RX ORDER — MIDAZOLAM HYDROCHLORIDE 1 MG/ML
INJECTION, SOLUTION INTRAMUSCULAR; INTRAVENOUS AS NEEDED
Status: DISCONTINUED | OUTPATIENT
Start: 2020-06-23 | End: 2020-06-23 | Stop reason: HOSPADM

## 2020-06-23 RX ORDER — HEPARIN SODIUM 200 [USP'U]/100ML
INJECTION, SOLUTION INTRAVENOUS
Status: COMPLETED | OUTPATIENT
Start: 2020-06-23 | End: 2020-06-23

## 2020-06-23 RX ORDER — FENTANYL CITRATE 50 UG/ML
INJECTION, SOLUTION INTRAMUSCULAR; INTRAVENOUS AS NEEDED
Status: DISCONTINUED | OUTPATIENT
Start: 2020-06-23 | End: 2020-06-23 | Stop reason: HOSPADM

## 2020-06-23 RX ORDER — LIDOCAINE HYDROCHLORIDE 10 MG/ML
INJECTION INFILTRATION; PERINEURAL AS NEEDED
Status: DISCONTINUED | OUTPATIENT
Start: 2020-06-23 | End: 2020-06-23 | Stop reason: HOSPADM

## 2020-06-23 RX ORDER — OMEPRAZOLE 20 MG/1
20 TABLET, DELAYED RELEASE ORAL DAILY
COMMUNITY

## 2020-06-23 RX ORDER — SODIUM CHLORIDE 9 MG/ML
1.5 INJECTION, SOLUTION INTRAVENOUS CONTINUOUS
Status: DISCONTINUED | OUTPATIENT
Start: 2020-06-23 | End: 2020-06-23 | Stop reason: HOSPADM

## 2020-06-23 RX ORDER — VARENICLINE TARTRATE 1 MG/1
1 TABLET, FILM COATED ORAL
COMMUNITY

## 2020-06-23 RX ADMIN — SODIUM CHLORIDE 3 ML/KG/HR: 900 INJECTION, SOLUTION INTRAVENOUS at 11:55

## 2020-06-23 NOTE — PROGRESS NOTES
Cardiac Cath Lab Procedure Area Arrival Note:    Karissa Gutierrez arrived to Cardiac Cath Lab, Procedure Area. Patient identifiers verified with NAME and DATE OF BIRTH. Procedure verified with patient. Consent forms verified. Allergies verified. Patient informed of procedure and plan of care. Questions answered with review. Patient voiced understanding of procedure and plan of care. Patient on cardiac monitor, non-invasive blood pressure, SPO2 monitor. On RA and placed on O2 @ 2 lpm via NC.  IV of NSS on pump at 296 ml/hr. Patient status doing well without problems. Patient is A&Ox 4. Patient reports no chest pain shortness of breath. Patient medicated during procedure with orders obtained and verified by Dr. Hanny Wick. Refer to patients Cardiac Cath Lab PROCEDURE REPORT for vital signs, assessment, status, and response during procedure, printed at end of case. Printed report on chart or scanned into chart. 13:52- Transfer to [facility] from Procedure Area    Verbal report given to ALTAF Chanel on Karissa Gutierrez being transferred to Cardiac Cath Lab RR for routine progression of care   Patient is post C procedure. Patient stable upon transfer to . Pt had right radial and right femoral access. Report consisted of patients Situation, Background, Assessment and   Recommendations(SBAR). Information from the following report(s) SBAR and Procedure Summary was reviewed with the receiving nurse. Opportunity for questions and clarification was provided. Patient medicated during procedure with orders obtained and verified by Dr. Hanny Wick. Refer to patient PROCEDURE REPORT for vital signs, assessment, status, and response during procedure.

## 2020-06-23 NOTE — PROGRESS NOTES
Pt verbalized understanding of discharge instructions instructions. PIV removed and guaze with tape placed; no bleeding or hematoma. Pt escorted to car via wheelchair with belongings.

## 2020-06-23 NOTE — PROGRESS NOTES
TR Band removed; no bleeding or hematoma. Guaze with tegaderm to site. Pt's right hand with good capillary refill and mobility.

## 2020-06-23 NOTE — DISCHARGE INSTRUCTIONS
[email or web address]    Patient Discharge Instructions    Cici Velasco / [id number] : 1960    Admitted 2020 Discharged: 2020       · It is important that you take the medication exactly as they are prescribed. · Keep your medication in the bottles provided by the pharmacist and keep a list of the medication names, dosages, and times to be taken in your wallet. · Do not take other medications without consulting your doctor. BRING ALL OF YOUR MEDICINES TO YOUR OFFICE VISIT with Dr. Nat Blair with Noe Martinez MD in 1 week      Cardiac Catheterization  Discharge Instructions     Do not drive, operate any machinery, or sign any legal documents for 24 hours after your procedure. You must have someone to drive you home.  You may take a shower 24 hours after your cardiac catheterization. Be sure to get the dressing wet and then remove it; gently wash the area with warm soapy water. Pat dry and leave open to air. To help prevent infections, be sure to keep the cath site clean and dry. No lotions, creams, powders, ointments, etc. in the cath site for approximately 1 week.  Do not take a tub bath, get in a hot tub or swimming pool for approximately 5 days or until the cath site is completely healed.  No strenuous activity or heavy lifting over 10 lbs. for 7 days.  Drink plenty of fluids for 24-48 hours after your cath to flush the contrast dye from your kidneys. No alcoholic beverages for 24 hours. You may resume your previous diet (low fat, low cholesterol) after your cath.  After your cath, some bruising or discomfort is common during the healing process. Tylenol, 1-2 tablets every 6 hours as needed, is recommended if you experience any discomfort.   If you experience any signs or symptoms of infection such as fever, chills, or poorly healing incision, persistent tenderness or swelling in the groin, redness and/or warmth to the touch, numbness, significant tingling or pain at the groin site or affected extremity, rash, drainage from the cath site, or if the leg feels tight or swollen, call your physician right away.  If bleeding at the cath site occurs, take a clean gauze pad and apply direct pressure to the groin just above the puncture site. Call 911 immediately, and continue to apply direct pressure until an ambulance gets to your location.  You may return to work  2  days after your cardiac cath if no groin bleeding. Information obtained by :  I understand that if any problems occur once I am at home I am to contact my physician. I understand and acknowledge receipt of the instructions indicated above.                                                                                                                                            R.N.'s Signature                                                                  Date/Time                                                                                                                                              Patient or Representative Signature                                                          Date/Time      Antelmo Shane MD

## 2020-06-23 NOTE — PROGRESS NOTES
Cardiac Cath Lab Recovery Arrival Note:      Shawn Becerra arrived to Cardiac Cath Lab, Recovery Area. Staff introduced to patient. Patient identifiers verified with NAME and DATE OF BIRTH. Procedure verified with patient. Consent forms reviewed and signed by patient or authorized representative and verified. Allergies verified. Patient and family oriented to department. Patient and family informed of procedure and plan of care. Questions answered with review. Patient prepped for procedure, per orders from physician, prior to arrival.    Patient on cardiac monitor, non-invasive blood pressure, SPO2 monitor. On Room Air. Patient is A&Ox 4. Patient reports No Pain. Patient in stretcher, in low position, with side rails up, call bell within reach, patient instructed to call if assistance as needed. Patient prep in: [address], [place]. Family in: Waiting Room.    Prep by: Justice Boss RN

## 2020-06-23 NOTE — PROGRESS NOTES
TRANSFER - IN REPORT:    Verbal report received from [facility] on [employer]  being received from procedure area for routine progression of care. Report consisted of patients Situation, Background, Assessment and Recommendations(SBAR). Information from the following report(s) SBAR, Procedure Summary, MAR, Recent Results and Cardiac Rhythm NSR was reviewed with the receiving clinician. Opportunity for questions and clarification was provided. Assessment completed upon patients arrival to [address] [place] [address] and care assumed. Cardiac Cath Lab Recovery Arrival Note:    [employer] arrived to [facility] recovery area. Patient procedure= LHC. Patient on cardiac monitor, non-invasive blood pressure, SPO2 monitor. On Room Air . IV  of NS on pump at 100 ml/hr. Patient status doing well without problems. Patient is A&Ox 4. Patient reports No Pain. PROCEDURE SITE CHECK:    Procedure site:without any bleeding and No Hematoma, No pain/discomfort reported at procedure site. No change in patient status. Continue to monitor patient and status.

## 2020-07-09 ENCOUNTER — OFFICE VISIT (OUTPATIENT)
Dept: PRIMARY CARE CLINIC | Age: 60
End: 2020-07-09

## 2020-07-09 DIAGNOSIS — Z01.818 PREOP EXAM FOR INTERNAL MEDICINE: Primary | ICD-10-CM

## 2020-07-09 DIAGNOSIS — Z11.59 SPECIAL SCREENING EXAMINATION FOR VIRAL DISEASE: ICD-10-CM

## 2020-07-09 NOTE — PROGRESS NOTES
Patient was seen at [facility] thru flu clinic. Please seen scanned form for additional visit documentation. Patient consented to treatment. For repeat cardiac cath next week by DR Aurora griffiths related issues    Patient Active Problem List    Diagnosis    Depression, major, recurrent, moderate (HCC)    Vitamin D deficiency    Chronic bilateral low back pain without sciatica    Nausea and vomiting    Gynecomastia, male    Cigarette smoker    Chronic obstructive pulmonary disease ([facility]    Coronary artery disease involving native coronary artery of native heart without angina pectoris    Depression    CHF (congestive heart failure) ([facility]    Encounter for screening colonoscopy    Sigmoid diverticulosis     No symptoms or issues now except COLLINS    Current Outpatient Medications   Medication Sig    omeprazole (PriLOSEC OTC) 20 mg tablet Take 20 mg by mouth daily.  varenicline (Chantix) 1 mg tablet Take 1 mg by mouth two (2) times daily (after meals).  ondansetron (ZOFRAN ODT) 8 mg disintegrating tablet Take 1 Tab by mouth every eight (8) hours as needed for Nausea.  citalopram (CELEXA) 20 mg tablet Take 40 mg by mouth daily.  dicyclomine (BENTYL) 10 mg capsule Take 10 mg by mouth two (2) times a day.  fluticasone propionate (FLONASE) 50 mcg/actuation nasal spray 1 [place] by Both Nostrils route three (3) times daily as needed for Rhinitis.  albuterol sulfate (VENTOLIN HFA IN) Take  by inhalation as needed (Pt stated that he uses about once month).  cyclobenzaprine (FLEXERIL) 10 mg tablet Take 0.5-1 Tabs by mouth as needed for Muscle Spasm(s).  rosuvastatin (CRESTOR) 10 mg tablet Take 3 Tabs by mouth nightly.  spironolactone (ALDACTONE) 50 mg tablet Take 1 Tab by mouth daily.  losartan (COZAAR) 50 mg tablet Take 50 mg by mouth daily.  carvedilol (COREG) 6.25 mg tablet Take 6.25 mg by mouth two (2) times daily (with meals).  Indications: HYPERTENSION     No current facility-administered medications for this visit. Vitals reviewed  BP Readings from Last 3 Encounters:   06/23/20 (!) 138/97   11/06/19 95/58   09/19/19 116/70     no apparent distress  No other exam    Screening now notify if positive      Diagnoses and all orders for this visit:    1. Preop exam for internal medicine  -     NOVEL CORONAVIRUS (COVID-19); Future    2. Special screening examination for viral disease  -     NOVEL CORONAVIRUS (COVID-19);  Future

## 2020-07-14 ENCOUNTER — HOSPITAL ENCOUNTER (OUTPATIENT)
Age: 60
Discharge: HOME | End: 2020-07-14
Attending: INTERNAL MEDICINE | Admitting: INTERNAL MEDICINE
Payer: MEDICAID

## 2020-07-14 VITALS
DIASTOLIC BLOOD PRESSURE: 73 MMHG | HEART RATE: 64 BPM | RESPIRATION RATE: 16 BRPM | SYSTOLIC BLOOD PRESSURE: 119 MMHG | TEMPERATURE: 99.1 F | HEIGHT: 70 IN | WEIGHT: 217 LBS | BODY MASS INDEX: 31.07 KG/M2 | OXYGEN SATURATION: 98 %

## 2020-07-14 DIAGNOSIS — R06.02 SHORTNESS OF BREATH: ICD-10-CM

## 2020-07-14 PROBLEM — Z98.890 S/P CARDIAC CATH: Status: ACTIVE | Noted: 2020-07-14

## 2020-07-14 PROCEDURE — 74011250637 HC RX REV CODE- 250/637: Performed by: INTERNAL MEDICINE

## 2020-07-14 PROCEDURE — 77030013715 HC INFL SYS MRTM -B: Performed by: INTERNAL MEDICINE

## 2020-07-14 PROCEDURE — 74011000258 HC RX REV CODE- 258: Performed by: INTERNAL MEDICINE

## 2020-07-14 PROCEDURE — 93005 ELECTROCARDIOGRAM TRACING: CPT

## 2020-07-14 PROCEDURE — C1725 CATH, TRANSLUMIN NON-LASER: HCPCS | Performed by: INTERNAL MEDICINE

## 2020-07-14 PROCEDURE — 99153 MOD SED SAME PHYS/QHP EA: CPT | Performed by: INTERNAL MEDICINE

## 2020-07-14 PROCEDURE — 74011250636 HC RX REV CODE- 250/636: Performed by: INTERNAL MEDICINE

## 2020-07-14 PROCEDURE — C1874 STENT, COATED/COV W/DEL SYS: HCPCS | Performed by: INTERNAL MEDICINE

## 2020-07-14 PROCEDURE — 93454 CORONARY ARTERY ANGIO S&I: CPT | Performed by: INTERNAL MEDICINE

## 2020-07-14 PROCEDURE — 92928 PRQ TCAT PLMT NTRAC ST 1 LES: CPT | Performed by: INTERNAL MEDICINE

## 2020-07-14 PROCEDURE — C1894 INTRO/SHEATH, NON-LASER: HCPCS | Performed by: INTERNAL MEDICINE

## 2020-07-14 PROCEDURE — 77030013744: Performed by: INTERNAL MEDICINE

## 2020-07-14 PROCEDURE — 74011000250 HC RX REV CODE- 250: Performed by: INTERNAL MEDICINE

## 2020-07-14 PROCEDURE — C1769 GUIDE WIRE: HCPCS | Performed by: INTERNAL MEDICINE

## 2020-07-14 PROCEDURE — 99152 MOD SED SAME PHYS/QHP 5/>YRS: CPT | Performed by: INTERNAL MEDICINE

## 2020-07-14 PROCEDURE — 99218 HC RM OBSERVATION: CPT

## 2020-07-14 PROCEDURE — C1760 CLOSURE DEV, VASC: HCPCS | Performed by: INTERNAL MEDICINE

## 2020-07-14 PROCEDURE — 74011636320 HC RX REV CODE- 636/320: Performed by: INTERNAL MEDICINE

## 2020-07-14 PROCEDURE — C1887 CATHETER, GUIDING: HCPCS | Performed by: INTERNAL MEDICINE

## 2020-07-14 DEVICE — STENT RONYX27512W RESOLUTE ONYX 2.75X12
Type: IMPLANTABLE DEVICE | Status: FUNCTIONAL
Brand: RESOLUTE ONYX™

## 2020-07-14 RX ORDER — PRASUGREL 10 MG/1
TABLET, FILM COATED ORAL AS NEEDED
Status: DISCONTINUED | OUTPATIENT
Start: 2020-07-14 | End: 2020-07-14 | Stop reason: HOSPADM

## 2020-07-14 RX ORDER — SODIUM CHLORIDE 9 MG/ML
1.5 INJECTION, SOLUTION INTRAVENOUS CONTINUOUS
Status: DISCONTINUED | OUTPATIENT
Start: 2020-07-14 | End: 2020-07-14 | Stop reason: HOSPADM

## 2020-07-14 RX ORDER — SODIUM CHLORIDE 9 MG/ML
3 INJECTION, SOLUTION INTRAVENOUS CONTINUOUS
Status: DISPENSED | OUTPATIENT
Start: 2020-07-14 | End: 2020-07-14

## 2020-07-14 RX ORDER — LIDOCAINE HYDROCHLORIDE 10 MG/ML
INJECTION INFILTRATION; PERINEURAL AS NEEDED
Status: DISCONTINUED | OUTPATIENT
Start: 2020-07-14 | End: 2020-07-14 | Stop reason: HOSPADM

## 2020-07-14 RX ORDER — MIDAZOLAM HYDROCHLORIDE 1 MG/ML
INJECTION, SOLUTION INTRAMUSCULAR; INTRAVENOUS AS NEEDED
Status: DISCONTINUED | OUTPATIENT
Start: 2020-07-14 | End: 2020-07-14 | Stop reason: HOSPADM

## 2020-07-14 RX ORDER — FENTANYL CITRATE 50 UG/ML
INJECTION, SOLUTION INTRAMUSCULAR; INTRAVENOUS AS NEEDED
Status: DISCONTINUED | OUTPATIENT
Start: 2020-07-14 | End: 2020-07-14 | Stop reason: HOSPADM

## 2020-07-14 RX ORDER — PRASUGREL 10 MG/1
10 TABLET, FILM COATED ORAL DAILY
Qty: 30 TAB | Refills: 5 | Status: SHIPPED | OUTPATIENT
Start: 2020-07-14

## 2020-07-14 RX ADMIN — SODIUM CHLORIDE 3 ML/KG/HR: 900 INJECTION, SOLUTION INTRAVENOUS at 14:39

## 2020-07-14 NOTE — ROUTINE PROCESS
Cardiac Cath Lab Recovery Arrival Note:      Leilani Myers arrived to Cardiac Cath Lab, Recovery Area. Staff introduced to patient. Patient identifiers verified with NAME and DATE OF BIRTH. Procedure verified with patient. Consent forms reviewed and signed by patient or authorized representative and verified. Allergies verified. Patient informed of procedure and plan of care. Questions answered with review. Patient prepped for procedure, per orders from physician, prior to arrival.    Patient on cardiac monitor, non-invasive blood pressure, SPO2 monitor. Patient is A&Ox 4. Patient reports no pain, no chest pain, no n/v. Patient in stretcher, in low position, with side rails up, call bell within reach, patient instructed to call of assistance as needed. Patient prep in: [facility] Recovery Area, Bed# 10. Family in: Socrates Inch [phone].    Prep by: Madison Jackson RN

## 2020-07-14 NOTE — PROGRESS NOTES
Transfer to [facility] from Procedure Area    Verbal report given to lupe on Hema Quintero being transferred to Cardiac Cath Lab  for routine progression of care   Patient is post left heart cath with intervention procedure. Patient stable upon transfer to . Report consisted of patients Situation, Background, Assessment and   Recommendations(SBAR). Information from the following report(s) Procedure Summary, MAR and Recent Results was reviewed with the receiving nurse. Opportunity for questions and clarification was provided. Patient medicated during procedure with orders obtained and verified by Dr. Damion Mcgill.    Refer to patient PROCEDURE REPORT for vital signs, assessment, status, and response during procedure.

## 2020-07-14 NOTE — DISCHARGE INSTRUCTIONS
[email or web address]    Patient Discharge Instructions    Jan Rodas / [id number] : 1960    Admitted 2020 Discharged: 2020       · It is important that you take the medication exactly as they are prescribed. · Keep your medication in the bottles provided by the pharmacist and keep a list of the medication names, dosages, and times to be taken in your wallet. · Do not take other medications without consulting your doctor. BRING ALL OF YOUR MEDICINES TO YOUR OFFICE VISIT with Dr. Justus Mckenzie with Abbie Cantu MD in 2 week      Cardiac Catheterization  Discharge Instructions     Do not drive, operate any machinery, or sign any legal documents for 24 hours after your procedure. You must have someone to drive you home.  You may take a shower 24 hours after your cardiac catheterization. Be sure to get the dressing wet and then remove it; gently wash the area with warm soapy water. Pat dry and leave open to air. To help prevent infections, be sure to keep the cath site clean and dry. No lotions, creams, powders, ointments, etc. in the cath site for approximately 1 week.  Do not take a tub bath, get in a hot tub or swimming pool for approximately 5 days or until the cath site is completely healed.  No strenuous activity or heavy lifting over 10 lbs. for 7 days.  Drink plenty of fluids for 24-48 hours after your cath to flush the contrast dye from your kidneys. No alcoholic beverages for 24 hours. You may resume your previous diet (low fat, low cholesterol) after your cath.  After your cath, some bruising or discomfort is common during the healing process. Tylenol, 1-2 tablets every 6 hours as needed, is recommended if you experience any discomfort.   If you experience any signs or symptoms of infection such as fever, chills, or poorly healing incision, persistent tenderness or swelling in the groin, redness and/or warmth to the touch, numbness, significant tingling or pain at the groin site or affected extremity, rash, drainage from the cath site, or if the leg feels tight or swollen, call your physician right away.  If bleeding at the cath site occurs, take a clean gauze pad and apply direct pressure to the groin just above the puncture site. Call 911 immediately, and continue to apply direct pressure until an ambulance gets to your location.  You may return to work  2  days after your cardiac cath if no groin bleeding. Information obtained by :  I understand that if any problems occur once I am at home I am to contact my physician. I understand and acknowledge receipt of the instructions indicated above. R.N.'s Signature                                                                  Date/Time                                                                                                                                              Patient or Representative Signature                                                          Date/Time      Gary Medina MD          CARDIAC CATHETERIZATION/ [address]    If possible, have someone stay with you for the first night. It is normal to feel tired for the first couple of days. Take it easy and follow your physicians instructions on activity. CHECK THE CATHETER INSERTION SITE DAILY:    If bleeding at the cath site occurs, take a clean washcloth and apply direct pressure just above the puncture site for at least 15 minutes. Call 911 immediately if the bleeding is not controlled. Continue to apply direct pressure until an ambulance gets to your location. Do not try to drive yourself or have someone else drive you to the hospital.  Have the ambulance bring you to the emergency room. You may shower 24 hours after your procedure.  Gently remove the bandage during showering. Wash with soap and water and pat dry. To prevent infection, keep the groin area/insertion site clean and dry. Do not apply powders, creams, lotions, or ointments to the site for 5 days. You may cover the site with a fresh Band-Aid each day until well healed. You may notice a small lump at the site. This is normal and may last up to 6 weeks. CALL THE PHYSICIAN:  ? If the site becomes red, swollen, or feels warm to the touch, or is healing poorly    ? If you note any large/extending bruise, fever >101.0 or chills  ? If your extremity has numbness, tingling, discoloration, abnormal swelling, tightness or pain   ? If you have difficulty with urination or develop nausea, vomiting, or severe abdominal pain    ACTIVITY for the first 24-48 hours, or as instructed by your physician:  ? No lifting, pushing or pulling over 10 pounds and no straining the insertion site. Do not lift grocery bags or the garbage can; do not run the vacuum  or  for 7 days. ? You may start walking short distances the day of your procedure. Gradually increase as tolerated each day. Current activity recommendations are 30 minutes of exercise at least 5 days a week. Work up to this as you recover. ? Avoid walking outside in extremes of heat or cold. Walk inside (at home, at the mall, or at a large store) when it is cold and windy or hot and humid. THINGS TO KEEP IN MIND:   ? Do not drive, operate any machinery, or sign any legal documents for 24 hours after your procedure, or as directed by your physician. You must have someone drive you home after your procedure. ? Drink plenty of fluids for 24-48 hours after your procedure to flush the contrast dye from your kidneys. ? Limit the number of times you go up and down the stairs  ? Take rests and pace yourself with activity  ?  Be careful and do not strain with bowel movements    MEDICATIONS:  ? Take all medications as prescribed  ? Call your physician if you have any questions  ? Keep an updated list of your medications with you at all times and give a list to your primary physician and pharmacist  ? You may use Tylenol 325mg 1-2 tablets every 6 hours as needed for pain or discomfort, unless otherwise instructed. If you have significant discomfort more than 48 hours after your procedure, please call your physicians office. SIGNS AND SYMPTOMS:  ? Notify your physician for new or recurrent symptoms of chest discomfort, unusual shortness of breath or fatigue. These could be signs of a problem and should be discussed with your physician. ? For significant chest pain or symptoms of angina not relieved with rest:  if you have been prescribed Nitroglycerin, take as directed (taken under the tongue, one at a time 5 minutes apart for a total of 3 doses, sitting down). If the discomfort is not relieved after the 3rd Nitroglycerin, call 911. If you have not been prescribed Nitroglycerin and your chest discomfort is significant, call 911. Take the ambulance, do not try to drive yourself or have someone else drive you to the hospital.     AFTER CARE:  ? Follow up with your physician as instructed  ? Follow a heart healthy diet with proper portion control, daily stress management, daily exercise, blood pressure and cholesterol control, and smoking cessation. The success of your stent, if you had one placed, and the prevention of future catheterizations heavily depends on your lifestyle changes you make now! ? You may start walking short distances the day of your procedure. Gradually increase as tolerated each day. Current activity recommendations are 30 minutes of exercise at least 5 days a week. Work up to this as you recover. Walk, ride a bike, or choose any other activity you enjoy to reach this activity goal.   ? Avoid walking outside in extremes of heat or cold.  Walk inside (at home, at the mall, or at a large store) when it is cold and windy or hot and humid. ? If you had a stent placed, consider Cardiac Wellness as a resource to help you make the needed lifestyle changes to live a heart healthy lifestyle. Discuss your candidacy with your physician. If you have questions, call your physicians office or the Cardiac Cath Lab at [phone]. The Cath Lab is operational from 6:30 a.m. to 5:00 p.m., Monday through Friday. After hours, notify your physician. [address] [place] [address] can be reached at [phone]. Cardiac Wellness is operational Monday-Thursday 8:30 a.m. to 5:00 p.m. and Friday 8:30 a.m. to 12:00 p.m.       Remember:  IN CASE OF BLEEDING: KEEP FIRM PRESSURE ON THE PROCEDURE SITE AND CALL [phone] IF NOT CONTROLLED

## 2020-07-14 NOTE — Clinical Note
Lesion located in the Distal Cx. Balloon inflated using multiple inflations inflation technique. Lesion #1: Pressure = 9 estee; Duration = 15 sec. Inflation 2: Pressure = 9 estee; Duration = 15 sec.

## 2020-07-14 NOTE — PROGRESS NOTES
TRANSFER - IN REPORT:    Verbal report received from [facility] on Vamsi Vail  being received from cath lab procedure area  for routine progression of care. Report consisted of patients Situation, Background, Assessment and Recommendations(SBAR). Information from the following report(s) Kardex and Procedure Summary was reviewed with the receiving clinician. Opportunity for questions and clarification was provided. Assessment completed upon patients arrival to [address] [place] [address] and care assumed. Cardiac Cath Lab Recovery Arrival Note:    Vamsi Vail arrived to [facility] recovery area. Patient procedure= LHC with stent placement. Patient on cardiac monitor, non-invasive blood pressure, SPO2 monitor. On RA . IV  of NS  on pump at 295 ml/hr. Patient status doing well without problems. Patient is A&Ox 3. Patient reports no pain. PROCEDURE SITE CHECK:    Procedure site:without any bleeding and hematoma, No pain/discomfort reported at procedure site. No change in patient status. Continue to monitor patient and status.

## 2020-07-14 NOTE — Clinical Note
Lesion: Located in the Distal Cx. Stent deployed. Single technique and multiple inflations used. First inflation pressure = 10 estee; inflation time: 15 sec. Second inflation pressure: 17 estee; inflation time: 9 sec.

## 2020-07-15 ENCOUNTER — PATIENT OUTREACH (OUTPATIENT)
Dept: CASE MANAGEMENT | Age: 60
End: 2020-07-15

## 2020-07-15 LAB — SARS-COV-2, NAA: NOT DETECTED

## 2020-07-15 NOTE — PROGRESS NOTES
I have reviewed discharge instructions with the patient. The patient verbalized understanding. Copy of discharge instructions given to patient. Patient is on Effient and has had a few episodes of sudden shortness of breath. Reviewed the patient's vital signs with Dr. Nellie Luna. He explained that is a side effect of Effient, and that if it worsens for the patient to call his office, but at this time per Dr. Nellie Luna the patient is to continue on Effient. Made patient aware. Per Ro, the patient can be discharged. Patient's wife is present to take him home.

## 2020-07-15 NOTE — PROGRESS NOTES
Atif Reed ambulated @ 7932 (time) approximately 100 feet. Patient tolerated ambulation without adverse advents. Right groin (right/left, groin/arm)  without bleeding or hematoma noted.

## 2020-07-16 LAB
ATRIAL RATE: 66 BPM
CALCULATED P AXIS, ECG09: 65 DEGREES
CALCULATED R AXIS, ECG10: 20 DEGREES
CALCULATED T AXIS, ECG11: 19 DEGREES
DIAGNOSIS, 93000: NORMAL
P-R INTERVAL, ECG05: 180 MS
Q-T INTERVAL, ECG07: 450 MS
QRS DURATION, ECG06: 102 MS
QTC CALCULATION (BEZET), ECG08: 471 MS
VENTRICULAR RATE, ECG03: 66 BPM

## 2020-07-17 ENCOUNTER — TELEPHONE (OUTPATIENT)
Dept: CARDIAC REHAB | Age: 60
End: 2020-07-17

## 2020-07-17 NOTE — TELEPHONE ENCOUNTER
7/17/2020 Cardiac Rehab: Called Mr. Rufus Parry to discuss participation in the Cardiac Rehab Program following stent on 7/14/2020. Left voicemail message. Will try again with a follow up call  next week.   Errol Moyer RN

## 2020-07-31 ENCOUNTER — PATIENT OUTREACH (OUTPATIENT)
Dept: CASE MANAGEMENT | Age: 60
End: 2020-07-31

## 2020-07-31 NOTE — PROGRESS NOTES
Patient resolved from Transition of Care episode on 7/14Patient/family has been provided the following resources and education related to COVID-19:                         Signs, symptoms and red flags related to COVID-19            CDC exposure and quarantine guidelines            [facility] exposure contact - [phone]            Contact for their local Department of Health                 Patient currently reports that the following symptoms have improved:  no symptoms     No further outreach scheduled with this CTN/ACM. Episode of Care resolved. Patient has this CTN/ACM contact information if future needs arise.

## 2020-08-19 ENCOUNTER — TELEPHONE (OUTPATIENT)
Dept: CARDIAC REHAB | Age: 60
End: 2020-08-19

## 2020-08-19 NOTE — TELEPHONE ENCOUNTER
8/19/2020 Cardiac Rehab: Called and spoke with Kathy Contreras. Discussed the cardiac rehab program and patient reported that \"I think I did that when I had my original heart attack and I know what I need to be doing\" and he declined re-enrollment. Hector Mattson RN    7/17/2020 Cardiac Rehab: Called Mr. Kathy Contreras to discuss participation in the Cardiac Rehab Program following stent on 7/14/2020. Left voicemail message. Will try again with a follow up call  next week.   Amy Rice, RN

## 2022-01-05 ENCOUNTER — APPOINTMENT (OUTPATIENT)
Dept: GENERAL RADIOLOGY | Age: 62
End: 2022-01-05
Attending: EMERGENCY MEDICINE
Payer: MEDICAID

## 2022-01-05 ENCOUNTER — HOSPITAL ENCOUNTER (EMERGENCY)
Age: 62
Discharge: HOME | End: 2022-01-05
Attending: EMERGENCY MEDICINE
Payer: MEDICAID

## 2022-01-05 VITALS
OXYGEN SATURATION: 94 % | DIASTOLIC BLOOD PRESSURE: 75 MMHG | TEMPERATURE: 98.4 F | WEIGHT: 205.03 LBS | SYSTOLIC BLOOD PRESSURE: 123 MMHG | BODY MASS INDEX: 30.37 KG/M2 | HEART RATE: 77 BPM | RESPIRATION RATE: 20 BRPM | HEIGHT: 69 IN

## 2022-01-05 DIAGNOSIS — R11.10 VOMITING, INTRACTABILITY OF VOMITING NOT SPECIFIED, PRESENCE OF NAUSEA NOT SPECIFIED, UNSPECIFIED VOMITING TYPE: Primary | ICD-10-CM

## 2022-01-05 DIAGNOSIS — Z91.89 AT INCREASED RISK OF EXPOSURE TO COVID-19 VIRUS: ICD-10-CM

## 2022-01-05 LAB
ALBUMIN SERPL-MCNC: 3.8 G/DL (ref 3.5–5)
ALBUMIN/GLOB SERPL: 1 {RATIO} (ref 1.1–2.2)
ALP SERPL-CCNC: 89 U/L (ref 45–117)
ALT SERPL-CCNC: 18 U/L (ref 12–78)
ANION GAP SERPL CALC-SCNC: 9 MMOL/L (ref 5–15)
APPEARANCE UR: CLEAR
AST SERPL-CCNC: 16 U/L (ref 15–37)
ATRIAL RATE: 82 BPM
BACTERIA URNS QL MICRO: NEGATIVE /HPF
BASOPHILS # BLD: 0 K/UL (ref 0–0.1)
BASOPHILS NFR BLD: 0 % (ref 0–1)
BILIRUB SERPL-MCNC: 0.7 MG/DL (ref 0.2–1)
BILIRUB UR QL: NEGATIVE
BUN SERPL-MCNC: 13 MG/DL (ref 6–20)
BUN/CREAT SERPL: 12 (ref 12–20)
CALCIUM SERPL-MCNC: 9.3 MG/DL (ref 8.5–10.1)
CALCULATED P AXIS, ECG09: 45 DEGREES
CALCULATED R AXIS, ECG10: 24 DEGREES
CALCULATED T AXIS, ECG11: 7 DEGREES
CHLORIDE SERPL-SCNC: 108 MMOL/L (ref 97–108)
CO2 SERPL-SCNC: 21 MMOL/L (ref 21–32)
COLOR UR: ABNORMAL
COMMENT, HOLDF: NORMAL
CREAT SERPL-MCNC: 1.12 MG/DL (ref 0.7–1.3)
DIAGNOSIS, 93000: NORMAL
DIFFERENTIAL METHOD BLD: ABNORMAL
EOSINOPHIL # BLD: 0 K/UL (ref 0–0.4)
EOSINOPHIL NFR BLD: 0 % (ref 0–7)
EPITH CASTS URNS QL MICRO: ABNORMAL /LPF
ERYTHROCYTE [DISTWIDTH] IN BLOOD BY AUTOMATED COUNT: 12.9 % (ref 11.5–14.5)
GLOBULIN SER CALC-MCNC: 3.9 G/DL (ref 2–4)
GLUCOSE SERPL-MCNC: 146 MG/DL (ref 65–100)
GLUCOSE UR STRIP.AUTO-MCNC: NEGATIVE MG/DL
HCT VFR BLD AUTO: 48 % (ref 36.6–50.3)
HGB BLD-MCNC: 16.3 G/DL (ref 12.1–17)
HGB UR QL STRIP: ABNORMAL
HYALINE CASTS URNS QL MICRO: ABNORMAL /LPF (ref 0–5)
IMM GRANULOCYTES # BLD AUTO: 0 K/UL (ref 0–0.04)
IMM GRANULOCYTES NFR BLD AUTO: 0 % (ref 0–0.5)
KETONES UR QL STRIP.AUTO: 15 MG/DL
LEUKOCYTE ESTERASE UR QL STRIP.AUTO: NEGATIVE
LIPASE SERPL-CCNC: 177 U/L (ref 73–393)
LYMPHOCYTES # BLD: 1.6 K/UL (ref 0.8–3.5)
LYMPHOCYTES NFR BLD: 13 % (ref 12–49)
MCH RBC QN AUTO: 30.8 PG (ref 26–34)
MCHC RBC AUTO-ENTMCNC: 34 G/DL (ref 30–36.5)
MCV RBC AUTO: 90.7 FL (ref 80–99)
MONOCYTES # BLD: 0.7 K/UL (ref 0–1)
MONOCYTES NFR BLD: 6 % (ref 5–13)
NEUTS SEG # BLD: 10 K/UL (ref 1.8–8)
NEUTS SEG NFR BLD: 81 % (ref 32–75)
NITRITE UR QL STRIP.AUTO: NEGATIVE
NRBC # BLD: 0 K/UL (ref 0–0.01)
NRBC BLD-RTO: 0 PER 100 WBC
P-R INTERVAL, ECG05: 168 MS
PH UR STRIP: 5.5 [PH] (ref 5–8)
PLATELET # BLD AUTO: 353 K/UL (ref 150–400)
PMV BLD AUTO: 9.8 FL (ref 8.9–12.9)
POTASSIUM SERPL-SCNC: 3.7 MMOL/L (ref 3.5–5.1)
PROT SERPL-MCNC: 7.7 G/DL (ref 6.4–8.2)
PROT UR STRIP-MCNC: 30 MG/DL
Q-T INTERVAL, ECG07: 390 MS
QRS DURATION, ECG06: 106 MS
QTC CALCULATION (BEZET), ECG08: 455 MS
RBC # BLD AUTO: 5.29 M/UL (ref 4.1–5.7)
RBC #/AREA URNS HPF: ABNORMAL /HPF (ref 0–5)
SAMPLES BEING HELD,HOLD: NORMAL
SARS-COV-2, COV2: NORMAL
SODIUM SERPL-SCNC: 138 MMOL/L (ref 136–145)
SP GR UR REFRACTOMETRY: 1.03 (ref 1–1.03)
TROPONIN-HIGH SENSITIVITY: 17 NG/L (ref 0–76)
TROPONIN-HIGH SENSITIVITY: 36 NG/L (ref 0–76)
UR CULT HOLD, URHOLD: NORMAL
UROBILINOGEN UR QL STRIP.AUTO: 1 EU/DL (ref 0.2–1)
VENTRICULAR RATE, ECG03: 82 BPM
WBC # BLD AUTO: 12.4 K/UL (ref 4.1–11.1)
WBC URNS QL MICRO: ABNORMAL /HPF (ref 0–4)

## 2022-01-05 PROCEDURE — U0005 INFEC AGEN DETEC AMPLI PROBE: HCPCS

## 2022-01-05 PROCEDURE — 96374 THER/PROPH/DIAG INJ IV PUSH: CPT

## 2022-01-05 PROCEDURE — 84484 ASSAY OF TROPONIN QUANT: CPT

## 2022-01-05 PROCEDURE — 99285 EMERGENCY DEPT VISIT HI MDM: CPT

## 2022-01-05 PROCEDURE — 36415 COLL VENOUS BLD VENIPUNCTURE: CPT

## 2022-01-05 PROCEDURE — 93005 ELECTROCARDIOGRAM TRACING: CPT

## 2022-01-05 PROCEDURE — 96361 HYDRATE IV INFUSION ADD-ON: CPT

## 2022-01-05 PROCEDURE — 74011250636 HC RX REV CODE- 250/636: Performed by: EMERGENCY MEDICINE

## 2022-01-05 PROCEDURE — 85025 COMPLETE CBC W/AUTO DIFF WBC: CPT

## 2022-01-05 PROCEDURE — 81001 URINALYSIS AUTO W/SCOPE: CPT

## 2022-01-05 PROCEDURE — 83690 ASSAY OF LIPASE: CPT

## 2022-01-05 PROCEDURE — 80053 COMPREHEN METABOLIC PANEL: CPT

## 2022-01-05 PROCEDURE — 71046 X-RAY EXAM CHEST 2 VIEWS: CPT

## 2022-01-05 RX ORDER — PROMETHAZINE HYDROCHLORIDE 25 MG/1
25 TABLET ORAL
Qty: 12 TABLET | Refills: 0 | Status: SHIPPED | OUTPATIENT
Start: 2022-01-05

## 2022-01-05 RX ORDER — ONDANSETRON 2 MG/ML
8 INJECTION INTRAMUSCULAR; INTRAVENOUS
Status: COMPLETED | OUTPATIENT
Start: 2022-01-05 | End: 2022-01-05

## 2022-01-05 RX ORDER — ONDANSETRON 4 MG/1
4 TABLET, FILM COATED ORAL
Qty: 15 TABLET | Refills: 0 | Status: SHIPPED | OUTPATIENT
Start: 2022-01-05

## 2022-01-05 RX ADMIN — ONDANSETRON HYDROCHLORIDE 8 MG: 2 SOLUTION INTRAMUSCULAR; INTRAVENOUS at 10:50

## 2022-01-05 RX ADMIN — SODIUM CHLORIDE 1000 ML: 9 INJECTION, SOLUTION INTRAVENOUS at 12:13

## 2022-01-05 RX ADMIN — SODIUM CHLORIDE 1000 ML: 9 INJECTION, SOLUTION INTRAVENOUS at 10:50

## 2022-01-05 NOTE — DISCHARGE INSTRUCTIONS
Please throw out your toothbrush and start fresh with a new toothbrush. Increase your fluids to 8 ounces every hour that you are awake including salty liquids such as soups, broths, seltzer water and Gatorade. Take Tylenol 3 tablets every 6 hours as needed for body aches and fever.

## 2022-01-05 NOTE — CONSULTS
[facility] Cardiology Consultation    Date of Consult:  01/05/22  Date of Admission: 1/5/2022  Primary Cardiologist: Dr. Micha Fall  Physician Requesting consult: Triston Hammer     Assessment/Gregor:  - CAD - no concerning anginal symptoms. His symptoms seem to be related to GI cause. ECG unchanged from prior. Troponin 17 --> 36 but in the absence of concerning cardiac symptoms, does not warrant further cardiac testing. Can continue his usual cardiac meds and follow up with Dr. Micha Fall as scheduled  - Chronic HFrEF - LVEF 35-40%. Appears euvolemic on exam  - Abdominal pain, N/V - defer management to ER team   - HTN  - HLD  - Tobacco abuse    Thank you for the opportunity to participate in the care of Melene Aschoff and please do not hesitate to contact us should you have any questions. Chief Complaint / Reason for Consult:   Troponin elevation    History of Present Illness:  Melene Aschoff is a 59 y.o. male with the below listed medical history who presented to the ER with right upper quadrant pain, vomiting and diarrhea since yesterday. He had some chills and diaphoresis as well. No chest pain or dyspnea. He has IBS but thinks his abdominal symptoms are worse than usual and unable to keep his medications. Symptoms are not similar to his prior cardiac symptoms. He had cath in 7/2020 with stenting of distal LCx, otherwise non-obstructive CAD. Past Medical History:   Diagnosis Date    Adverse effect of anesthesia     \"HEART STOPPED\" DURING ACHILLES TENDON REPAIR - WAS TOLD HE NEEDED TO GO SEE HIS HEART DOCTOR    Arthritis     osteo-- primarily affecting back, hands    Asthma     last exacerbation 1 yr ago. Uses albuterol rarely only. Never hospitalized    Chronic obstructive pulmonary disease (HCC)     Chronic pain     BACK/HIPS    Depression     Eczema     GERD (gastroesophageal reflux disease)     occasional only    Heart attack [facility]) 2006    Dr Gabe De La O;  stents x2.  Last stress test 2009--WNL    Hypercholesteremia     IBS (irritable bowel syndrome)     Ill-defined condition     OBESE PER PT    Ill-defined condition     ATOPIC DERMATITIS       Prior to Admission medications    Medication Sig Start Date End Date Taking? Authorizing Provider   ondansetron hcl (Zofran) 4 mg tablet Take 1 Tablet by mouth every eight (8) hours as needed for Nausea or Vomiting. 1/5/22  Yes Rajeev Wallis NP   promethazine (PHENERGAN) 25 mg tablet Take 1 Tablet by mouth every six (6) hours as needed for Nausea. 1/5/22  Yes Rajeev Wallis NP   prasugreL (Effient) 10 mg tablet Take 1 Tab by mouth daily. 7/14/20   Alexandra Simon MD   omeprazole (PriLOSEC OTC) 20 mg tablet Take 20 mg by mouth daily. Provider, Historical   varenicline (Chantix) 1 mg tablet Take 1 mg by mouth two (2) times daily (after meals). Provider, Historical   ondansetron (ZOFRAN ODT) 8 mg disintegrating tablet Take 1 Tab by mouth every eight (8) hours as needed for Nausea. 11/6/19   Naina David MD   citalopram (CELEXA) 20 mg tablet Take 40 mg by mouth daily. Provider, Historical   dicyclomine (BENTYL) 10 mg capsule Take 10 mg by mouth two (2) times a day. Provider, Historical   fluticasone propionate (FLONASE) 50 mcg/actuation nasal spray 1 [place] by Both Nostrils route three (3) times daily as needed for Rhinitis. Provider, Historical   albuterol sulfate (VENTOLIN HFA IN) Take  by inhalation as needed (Pt stated that he uses about once month). Provider, Historical   cyclobenzaprine (FLEXERIL) 10 mg tablet Take 0.5-1 Tabs by mouth as needed for Muscle Spasm(s). 11/8/18   Fabien Maria MD   rosuvastatin (CRESTOR) 10 mg tablet Take 3 Tabs by mouth nightly. 11/8/18   Fabien Maria MD   spironolactone (ALDACTONE) 50 mg tablet Take 1 Tab by mouth daily. 8/4/17   Mel Encinas MD   losartan (COZAAR) 50 mg tablet Take 50 mg by mouth daily. Other, MD Slava   carvedilol (COREG) 6.25 mg tablet Take 6.25 mg by mouth two (2) times daily (with meals). Indications: HYPERTENSION    Provider, Historical       No current facility-administered medications for this encounter. Current Outpatient Medications   Medication Sig    ondansetron hcl (Zofran) 4 mg tablet Take 1 Tablet by mouth every eight (8) hours as needed for Nausea or Vomiting.  promethazine (PHENERGAN) 25 mg tablet Take 1 Tablet by mouth every six (6) hours as needed for Nausea.  prasugreL (Effient) 10 mg tablet Take 1 Tab by mouth daily.  omeprazole (PriLOSEC OTC) 20 mg tablet Take 20 mg by mouth daily.  varenicline (Chantix) 1 mg tablet Take 1 mg by mouth two (2) times daily (after meals).  ondansetron (ZOFRAN ODT) 8 mg disintegrating tablet Take 1 Tab by mouth every eight (8) hours as needed for Nausea.  citalopram (CELEXA) 20 mg tablet Take 40 mg by mouth daily.  dicyclomine (BENTYL) 10 mg capsule Take 10 mg by mouth two (2) times a day.  fluticasone propionate (FLONASE) 50 mcg/actuation nasal spray 1 [place] by Both Nostrils route three (3) times daily as needed for Rhinitis.  albuterol sulfate (VENTOLIN HFA IN) Take  by inhalation as needed (Pt stated that he uses about once month).  cyclobenzaprine (FLEXERIL) 10 mg tablet Take 0.5-1 Tabs by mouth as needed for Muscle Spasm(s).  rosuvastatin (CRESTOR) 10 mg tablet Take 3 Tabs by mouth nightly.  spironolactone (ALDACTONE) 50 mg tablet Take 1 Tab by mouth daily.  losartan (COZAAR) 50 mg tablet Take 50 mg by mouth daily.  carvedilol (COREG) 6.25 mg tablet Take 6.25 mg by mouth two (2) times daily (with meals).  Indications: HYPERTENSION       Family History   Family history unknown: Yes     No family h/o SCD or premature CAD    Social History     Socioeconomic History    Marital status: LEGALLY      Spouse name: Not on file    Number of children: Not on file    Years of education: Not on file    Highest education level: Not on file   Occupational History    Not on file   Tobacco Use    Smoking status: Current Every Day Smoker     Packs/day: 0.50    Smokeless tobacco: Never Used   Substance and Sexual Activity    Alcohol use: No    Drug use: Yes     Types: Marijuana    Sexual activity: Yes     Partners: Female   Other Topics Concern    Not on file   Social History Narrative    Not on file     Social Determinants of Health     Financial Resource Strain:     Difficulty of Paying Living Expenses: Not on file   Food Insecurity:     Worried About Running Out of Food in the Last Year: Not on file    Curtis of Food in the Last Year: Not on file   Transportation Needs:     Lack of Transportation (Medical): Not on file    Lack of Transportation (Non-Medical): Not on file   Physical Activity:     Days of Exercise per Week: Not on file    Minutes of Exercise per Session: Not on file   Stress:     Feeling of Stress : Not on file   Social Connections:     Frequency of Communication with Friends and Family: Not on file    Frequency of Social Gatherings with Friends and Family: Not on file    Attends [employer] Services: Not on file    Active Member of [address] [place] [address] or Organizations: Not on file    Attends Club or Organization Meetings: Not on file    Marital Status: Not on file   Intimate Partner Violence:     Fear of Current or Ex-Partner: Not on file    Emotionally Abused: Not on file    Physically Abused: Not on file    Sexually Abused: Not on file   Housing Stability:     Unable to Pay for Housing in the Last Year: Not on file    Number of [place] in the Last Year: Not on file    Unstable Housing in the Last Year: Not on file       ROS  ROS: All other systems reviewed and were negative other than mentioned above.      Visit Vitals  /73 (BP 1 Location: Left upper arm, BP Patient Position: Sitting)   Pulse 73   Temp 98.2 °F (36.8 °C)   Resp 22   Ht 5' 9\" (1.753 m)   Wt 93 kg (205 lb 0.4 oz)   SpO2 96%   BMI 30.28 kg/m²       No intake or output data in the 24 hours ending 01/05/22 8619 General: resting comfortably in no distress  HEENT: sclera anicteric, moist mucous membranes  Neck: supple, no JVD or HJR  CV: distant hear sounds, regular rate and rhythm, no murmurs  Lungs: no respiratory distress, lungs clear to auscultation without wheezing or rales  Abdomen: soft, non distended  MSK: no lower extremity edema  Skin: warm to touch  Neuro: alert and oriented, answered questions appropriately  Psych: normal mood and affect     Lab Review:  BMP:   Lab Results   Component Value Date/Time     01/05/2022 10:20 AM    K 3.7 01/05/2022 10:20 AM     01/05/2022 10:20 AM    CO2 21 01/05/2022 10:20 AM    AGAP 9 01/05/2022 10:20 AM     (H) 01/05/2022 10:20 AM    BUN 13 01/05/2022 10:20 AM    CREA 1.12 01/05/2022 10:20 AM    GFRAA >60 01/05/2022 10:20 AM    GFRNA >60 01/05/2022 10:20 AM        CBC:  Lab Results   Component Value Date/Time    WBC 12.4 (H) 01/05/2022 10:20 AM    HGB 16.3 01/05/2022 10:20 AM    HCT 48.0 01/05/2022 10:20 AM    PLATELET 519 49/36/5661 10:20 AM    MCV 90.7 01/05/2022 10:20 AM       All Cardiac Markers in the last 24 hours:  No results found for: CPK, CK, CKMMB, CKMB, RCK3, CKMBT, CKMBPOC, CKNDX, CKND1, DARIUSZ, TROPT, TROIQ, CASIMIRO, TROPT, TNIPOC, BNP, BNPP, BNPNT    Lab Results   Component Value Date/Time    Cholesterol, total 137 11/14/2018 11:01 AM    HDL Cholesterol 51 11/14/2018 11:01 AM    LDL, calculated 64 11/14/2018 11:01 AM    VLDL, calculated 22 11/14/2018 11:01 AM    Triglyceride 111 11/14/2018 11:01 AM        Data Review:  ECG tracing personally reviewed: NSR, IVCD, inferior infarct, unchanged from prior          Signed:  Stephen Hilliard, [facility]  01/05/22

## 2022-01-05 NOTE — ED PROVIDER NOTES
HPI patient is a 28-year-old male with past medical history significant for coronary artery disease, GERD, eczema, depression, COPD, chronic back pain, irritable bowel syndrome and osteoarthritis who presents to the ED with right upper quadrant abdominal pain and episodic nonbloody vomiting since yesterday. He has had one Covid vaccine in the [facility]. Denies fever, cold symptoms, headache,neck pain, visual changes, focal weakness or rash. Denies any  difficulty breathing, difficulty swallowing, SOB or chest pain. Reports decreased appetite. He was brought in by EMS and has not had any medications or food since yesterday morning. Past Medical History:   Diagnosis Date    Adverse effect of anesthesia     \"HEART STOPPED\" DURING ACHILLES TENDON REPAIR - WAS TOLD HE NEEDED TO GO SEE HIS HEART DOCTOR    Arthritis     osteo-- primarily affecting back, hands    Asthma     last exacerbation 1 yr ago. Uses albuterol rarely only. Never hospitalized    Chronic obstructive pulmonary disease (HCC)     Chronic pain     BACK/HIPS    Depression     Eczema     GERD (gastroesophageal reflux disease)     occasional only    Heart attack [facility]) 2006    Dr Nini Vanegas;  stents x2.  Last stress test 2009--WNL    Hypercholesteremia     IBS (irritable bowel syndrome)     Ill-defined condition     OBESE PER PT    Ill-defined condition     ATOPIC DERMATITIS       Past Surgical History:   Procedure Laterality Date    CARDIAC SURG PROCEDURE UNLIST  2006    stent placed s/p MI    HX ORTHOPAEDIC  2008    Rt knee reconstruction    HX ORTHOPAEDIC      RT ACHILLES TENDON REPAIR    HX OTHER SURGICAL      rectal surgery    HX TONSILLECTOMY           Family History:   Family history unknown: Yes       Social History     Socioeconomic History    Marital status: LEGALLY      Spouse name: Not on file    Number of children: Not on file    Years of education: Not on file    Highest education level: Not on file   Occupational History    Not on file   Tobacco Use    Smoking status: Current Every Day Smoker     Packs/day: 0.50    Smokeless tobacco: Never Used   Substance and Sexual Activity    Alcohol use: No    Drug use: Yes     Types: Marijuana    Sexual activity: Yes     Partners: Female   Other Topics Concern    Not on file   Social History Narrative    Not on file     Social Determinants of Health     Financial Resource Strain:     Difficulty of Paying Living Expenses: Not on file   Food Insecurity:     Worried About Running Out of Food in the Last Year: Not on file    Curtis of Food in the Last Year: Not on file   Transportation Needs:     Lack of Transportation (Medical): Not on file    Lack of Transportation (Non-Medical): Not on file   Physical Activity:     Days of Exercise per Week: Not on file    Minutes of Exercise per Session: Not on file   Stress:     Feeling of Stress : Not on file   Social Connections:     Frequency of Communication with Friends and Family: Not on file    Frequency of Social Gatherings with Friends and Family: Not on file    Attends Scientology Services: Not on file    Active Member of [address] [place] [address] or Organizations: Not on file    Attends Club or Organization Meetings: Not on file    Marital Status: Not on file   Intimate Partner Violence:     Fear of Current or Ex-Partner: Not on file    Emotionally Abused: Not on file    Physically Abused: Not on file    Sexually Abused: Not on file   Housing Stability:     Unable to Pay for Housing in the Last Year: Not on file    Number of [place] in the Last Year: Not on file    Unstable Housing in the Last Year: Not on file         ALLERGIES: Lisinopril, Aspirin, Codeine, and Hydrocodone    Review of Systems   Constitutional: Positive for activity change and appetite change. Negative for fever and unexpected weight change. HENT: Negative for congestion and trouble swallowing. Eyes: Negative for visual disturbance. Respiratory: Negative for cough and shortness of breath. Cardiovascular: Negative for chest pain, palpitations and leg swelling. Gastrointestinal: Positive for abdominal pain, nausea and vomiting. Negative for constipation and diarrhea. Genitourinary: Negative for dysuria. Musculoskeletal: Positive for arthralgias. Negative for myalgias. Skin: Negative for rash. Neurological: Negative for dizziness, light-headedness and headaches. All other systems reviewed and are negative. Vitals:    01/05/22 0932   BP: 113/73   Pulse: 88   Resp: 22   Temp: 98.2 °F (36.8 °C)   SpO2: 94%   Weight: 93 kg (205 lb 0.4 oz)   Height: 5' 9\" (1.753 m)            Physical Exam  Vitals reviewed. Constitutional:       General: He is not in acute distress. Appearance: Normal appearance. He is not ill-appearing, toxic-appearing or diaphoretic. Comments: Male; ; former smoker   HENT:      Head: Normocephalic. Cardiovascular:      Rate and Rhythm: Normal rate and regular rhythm. Pulmonary:      Effort: Pulmonary effort is normal.      Breath sounds: Normal breath sounds. Abdominal:      Palpations: Abdomen is soft. Tenderness: There is abdominal tenderness. There is no guarding or rebound. Hernia: No hernia is present. Musculoskeletal:         General: Normal range of motion. Cervical back: Normal range of motion and neck supple. Right lower leg: No edema. Left lower leg: No edema. Lymphadenopathy:      Cervical: No cervical adenopathy. Skin:     General: Skin is warm. Neurological:      General: No focal deficit present. Mental Status: He is alert and oriented to person, place, and time.    Psychiatric:         Mood and Affect: Mood normal.         Behavior: Behavior normal.          [facility]  ED Course as of 01/05/22 1421   Wed Jan 05, 2022   1356 EKG obtained at 1350 showing sinus rhythm, rate 82, normal intervals, nonspecific T wave inversion in lead III however not significantly changed compared to prior EKG [JE]      ED Course User Index  [JE] Juan Jay MD       Procedures    XR CHEST PA LAT    Result Date: 1/5/2022  No acute intrathoracic disease. Labs Reviewed   CBC WITH AUTOMATED DIFF - Abnormal; Notable for the following components:       Result Value    WBC 12.4 (*)     NEUTROPHILS 81 (*)     ABS. NEUTROPHILS 10.0 (*)     All other components within normal limits   METABOLIC PANEL, COMPREHENSIVE - Abnormal; Notable for the following components:    Glucose 146 (*)     A-G Ratio 1.0 (*)     All other components within normal limits   URINALYSIS W/MICROSCOPIC - Abnormal; Notable for the following components:    Protein 30 (*)     Ketone 15 (*)     Blood MODERATE (*)     All other components within normal limits   URINE CULTURE HOLD SAMPLE   SAMPLES BEING HELD   LIPASE   2 [address] [place] SENSITIVITY     Consult cardiology (Dr. Titi Marsh) came to the ED to evaluate. He does not feel that the patient's symptoms are cardiac related. He recommends discharge home with follow-up in their office. Patient has been reexamined and states he feels slightly better after IV fluids. Patient's results and plan of care have been reviewed with him. Patient and/or family have verbally conveyed their understanding and agreement of the patient's signs, symptoms, diagnosis, treatment and prognosis and additionally agree to follow up as recommended or return to the Emergency Room should his condition change prior to follow-up. Discharge instructions have also been provided to the patient with some educational information regarding his diagnosis as well a list of reasons why he would want to return to the ER prior to his follow-up appointment should his condition change. Julio Barrera NP

## 2022-01-06 ENCOUNTER — PATIENT OUTREACH (OUTPATIENT)
Dept: CASE MANAGEMENT | Age: 62
End: 2022-01-06

## 2022-01-06 LAB
SARS-COV-2, XPLCVT: NOT DETECTED
SOURCE, COVRS: NORMAL

## 2022-01-06 NOTE — ACP (ADVANCE CARE PLANNING)
not on file. Patient is interested in additional information regarding Advance Care Planning; he will follow-up with his PCP. Internal referral to ACP specialist by ACM today; patient is agreeable to this plan.

## 2022-01-06 NOTE — PROGRESS NOTES
2022  12:08 PM    Patient contacted regarding COVID-19 risk. Discussed COVID-19 related testing which was available at this time. Test results were negative. Patient informed of results, if available? yes. Ambulatory Care Manager contacted the patient by telephone to perform post discharge assessment. Call within 2 business days of discharge: Yes Verified name and  with patient as identifiers. Provided introduction to self, and explanation of the CTN/ACM role, and reason for call due to risk factors for infection and/or exposure to COVID-19. Patient denies known exposure to COVID-19. Symptoms reviewed with patient who verbalized the following symptoms: fever, fatigue, no new symptoms and no worsening symptoms     Patient reports temporal temperature of 97.9F this morning; he has not taken Tylenol since yesterday. Patient states, \"Whatever it was it's getting better. I'm feeling on the mend. \"       Due to no new or worsening symptoms encounter was not routed to provider for escalation. Discussed follow-up appointments. If no appointment was previously scheduled, appointment scheduling offered:  No. Patient is advised to contact his PCP regarding scheduling ED follow-up. [facility] follow up appointment(s): No future appointments. [facility] follow up appointment(s): N/A    Interventions to address risk factors: Obtained and reviewed discharge summary and/or continuity of care documents and Education of patient/family/caregiver/guardian to support self-management-emergency warning signs and when to seek emergency medical attention. Advance Care Planning:   Does patient have an Advance Directive: not on file. Patient is interested in additional information regarding Advance Care Planning; he will follow-up with his PCP. Internal referral to ACP specialist by ACM today; patient is agreeable to this plan. Educated patient about risk for severe COVID-19 due to risk factors according to CDC guidelines. ACM reviewed discharge instructions, medical action plan and red flag symptoms with the patient who verbalized understanding. Discussed COVID vaccination status: Yes; pt reports that he received the Ainsley Huerta COVID-19 vaccine. Education provided on COVID-19 vaccination as appropriate. Discussed exposure protocols and quarantine with CDC Guidelines. Patient was given an opportunity to verbalize any questions and concerns and agrees to contact ACM or health care provider for questions related to their healthcare. Reviewed and educated patient on any new and changed medications related to discharge diagnosis. Was patient discharged with a pulse oximeter? No. Discussed and confirmed pulse oximeter discharge instructions and when to notify provider or seek emergency care. ACM provided contact information. Plan for follow-up call in 5-7 days based on severity of symptoms and risk factors.

## 2022-01-13 ENCOUNTER — PATIENT OUTREACH (OUTPATIENT)
Dept: CASE MANAGEMENT | Age: 62
End: 2022-01-13

## 2022-01-13 NOTE — PROGRESS NOTES
1/13/2022  3:43 PM    Follow Up Call    Patient outreach attempt by this ACM today to perform follow-up assessment. Patient's wife answered the telephone and informed ACM that patient is currently sleeping and unavailable to the telephone. ACM contact information provided for patient to return ACM phone call at his convenience. Patient has not had any additional ED or hospital visits. No further outreach scheduled with this CTN/ACM. Episode of Care resolved. Patient has this CTN/ACM contact information if future needs arise.

## 2022-01-24 ENCOUNTER — PATIENT OUTREACH (OUTPATIENT)
Dept: CASE MANAGEMENT | Age: 62
End: 2022-01-24

## 2022-01-24 NOTE — ACP (ADVANCE CARE PLANNING)
Outbound call made to patient who states he does not have email and is requesting to complete by mail. Patient referred to Miguel. No further outreach by me scheduled at this time.

## 2022-01-26 ENCOUNTER — PATIENT OUTREACH (OUTPATIENT)
Dept: CASE MANAGEMENT | Age: 62
End: 2022-01-26

## 2022-01-26 NOTE — ACP (ADVANCE CARE PLANNING)
Advance Care Planning   Ambulatory ACP Specialist Patient Outreach    Date:  1/26/2022    ACP Specialist:  Kina Estrada LPN    Outreach call to patient in follow-up to ACP Specialist referral from:    [] PCP  [] Provider   [] Ambulatory Care Management [x] Other     For:                  [x] Advance Directive Assistance              [] Complete Portable DNR order              [] Complete POST/MOST              [] Code Status Discussion             [] Discuss Goals of Care             [] Early ACP Decision-Making              [] Other (Specify)    Date Referral Received: 1/25/22    Today's Outreach:  [x] First   [] Second  [] Third       Third outreach made by: [] Phone  [] Email / mail    [] [employer]     Intervention:  [] Spoke with Patient   [x] Left VM requesting return call      Outcome: First attempt to contact pt regarding ACP. No answer on mobile phone. VM left requesting a return call. Will attempt 2nd outreach within one week. Next Step:   [] ACP scheduled conversation  [x] Outreach again in one week               [] Email / Mail ACP Info Sheets  [] Email / Mail Advance Directive   [] Closing referral.  Routing closure to referring provider/staff and to ACP Specialist . [] Closure letter mailed to patient with invitation to contact ACP Specialist if / when ready.   Thank you for this referral.

## 2022-02-02 ENCOUNTER — PATIENT OUTREACH (OUTPATIENT)
Dept: CASE MANAGEMENT | Age: 62
End: 2022-02-02

## 2022-02-02 NOTE — ACP (ADVANCE CARE PLANNING)
Advance Care Planning   Ambulatory ACP Specialist Patient Outreach    Date:  2/2/2022    ACP Specialist:  Makenna Banks LPN    Outreach call to patient in follow-up to ACP Specialist referral from:    [] PCP  [] Provider   [] Ambulatory Care Management [x] Other     For:                  [x] Advance Directive Assistance              [] Complete Portable DNR order              [] Complete POST/MOST              [] Code Status Discussion             [] Discuss Goals of Care             [] Early ACP Decision-Making              [] Other (Specify)    Date Referral Received: 1/25/22    Today's Outreach:  [] First   [x] Second  [] Third       Third outreach made by: [] Phone  [] Email / mail    [] [employer]     Intervention:  [x] Spoke with Patient   [] Left VM requesting return call      Outcome: Spoke with patient who wished to review ACP documents before scheduling appointment. Will mail documents to patient and follow up within two week       Next Step:   [] ACP scheduled conversation  [x] Outreach again in one week               [x] Email / Mail [address]  [] Email / Mail Advance Directive   [] Closing referral.  Routing closure to referring provider/staff and to ACP Specialist . [] Closure letter mailed to patient with invitation to contact ACP Specialist if / when ready.   Thank you for this referral.

## 2022-02-18 ENCOUNTER — PATIENT OUTREACH (OUTPATIENT)
Dept: CASE MANAGEMENT | Age: 62
End: 2022-02-18

## 2022-02-18 NOTE — ACP (ADVANCE CARE PLANNING)
Advance Care Planning   Ambulatory ACP Specialist Patient Outreach    Date:  2/18/2022    ACP Specialist:  Bridgette Silva LPN    Outreach call to patient in follow-up to ACP Specialist referral from:    [x] PCP  [] Provider   [] Ambulatory Care Management [] Other     For:                  [] Advance Directive Assistance              [] Complete Portable DNR order              [] Complete POST/MOST              [] Code Status Discussion             [] Discuss Goals of Care             [] Early ACP Decision-Making              [x] Other (Specify)    Date Referral Received: 1/25/22    Today's Outreach:  [] First   [x] Second  [] Third       Third outreach made by: [] Phone  [] Email / mail    [] [place]     Intervention:  [] Spoke with Patient   [] Left VM requesting return call      Outcome:  Attempted to contact pt to confirm receipt of ACP documents that were mailed for review. No answer on mobile phone. Vm left requesting return call. Will follow up again within one week. Next Step:   [] ACP scheduled conversation  [x] Outreach again in one week               [] Email / Mail ACP Info Sheets  [] Email / Mail Advance Directive   [] Closing referral.  Routing closure to referring provider/staff and to ACP Specialist . [] Closure letter mailed to patient with invitation to contact ACP Specialist if / when ready.   Thank you for this referral.

## 2022-03-02 ENCOUNTER — PATIENT OUTREACH (OUTPATIENT)
Dept: CASE MANAGEMENT | Age: 62
End: 2022-03-02

## 2022-03-02 NOTE — ACP (ADVANCE CARE PLANNING)
Advance Care Planning   Ambulatory ACP Specialist Patient Outreach    Date:  3/2/2022    ACP Specialist:  Dami Murphy LPN    Outreach call to patient in follow-up to ACP Specialist referral from:    [] PCP  [] Provider   [] Ambulatory Care Management [x] Other     For:                  [x] Advance Directive Assistance              [] Complete Portable DNR order              [] Complete POST/MOST              [] Code Status Discussion             [] Discuss Goals of Care             [] Early ACP Decision-Making              [] Other (Specify)    Date Referral Received: 1/25/22    Today's Outreach:  [] First   [] Second  [x] Third       Third outreach made by: [] Phone  [] Email / mail    [] [employer]     Intervention:  [] Spoke with Patient   [] Left VM requesting return call      Outcome:  Final attempt to contact pt to confirm receipt of ACP documents that were mailed for review. No answer on mobile phone. Vm left requesting return call. Will close referral at this time. Next Step:   [] ACP scheduled conversation  [] Outreach again in one week               [] Email / Mail ACP Info Sheets  [] Email / Mail Advance Directive   [x] Closing referral.  Routing closure to referring provider/staff and to ACP Specialist . [] Closure letter mailed to patient with invitation to contact ACP Specialist if / when ready.   Thank you for this referral.

## 2022-03-18 PROBLEM — M54.50 CHRONIC BILATERAL LOW BACK PAIN WITHOUT SCIATICA: Status: ACTIVE | Noted: 2018-11-08

## 2022-03-18 PROBLEM — F17.210 CIGARETTE SMOKER: Status: ACTIVE | Noted: 2018-11-08

## 2022-03-18 PROBLEM — F32.A DEPRESSION: Status: ACTIVE | Noted: 2018-11-08

## 2022-03-18 PROBLEM — G89.29 CHRONIC BILATERAL LOW BACK PAIN WITHOUT SCIATICA: Status: ACTIVE | Noted: 2018-11-08

## 2022-03-19 PROBLEM — N62 GYNECOMASTIA, MALE: Status: ACTIVE | Noted: 2018-11-08

## 2022-03-19 PROBLEM — J44.9 CHRONIC OBSTRUCTIVE PULMONARY DISEASE (HCC): Status: ACTIVE | Noted: 2018-11-08

## 2022-03-19 PROBLEM — F33.1 DEPRESSION, MAJOR, RECURRENT, MODERATE (HCC): Status: ACTIVE | Noted: 2019-02-14

## 2022-03-19 PROBLEM — I50.9 CHF (CONGESTIVE HEART FAILURE) (HCC): Status: ACTIVE | Noted: 2018-11-08

## 2022-03-19 PROBLEM — I25.10 CORONARY ARTERY DISEASE INVOLVING NATIVE CORONARY ARTERY OF NATIVE HEART WITHOUT ANGINA PECTORIS: Status: ACTIVE | Noted: 2018-11-08

## 2022-03-19 PROBLEM — Z98.890 S/P CARDIAC CATH: Status: ACTIVE | Noted: 2020-07-14

## 2022-03-20 PROBLEM — E55.9 VITAMIN D DEFICIENCY: Status: ACTIVE | Noted: 2019-01-03

## 2022-03-20 PROBLEM — R11.2 NAUSEA AND VOMITING: Status: ACTIVE | Noted: 2018-11-08

## 2023-03-14 ENCOUNTER — APPOINTMENT (OUTPATIENT)
Dept: CT IMAGING | Age: 63
End: 2023-03-14
Attending: EMERGENCY MEDICINE
Payer: MEDICAID

## 2023-03-14 ENCOUNTER — HOSPITAL ENCOUNTER (EMERGENCY)
Age: 63
Discharge: HOME | End: 2023-03-14
Attending: EMERGENCY MEDICINE
Payer: MEDICAID

## 2023-03-14 VITALS
DIASTOLIC BLOOD PRESSURE: 84 MMHG | HEIGHT: 69 IN | RESPIRATION RATE: 20 BRPM | BODY MASS INDEX: 31.51 KG/M2 | TEMPERATURE: 97.9 F | SYSTOLIC BLOOD PRESSURE: 123 MMHG | WEIGHT: 212.74 LBS | OXYGEN SATURATION: 93 % | HEART RATE: 75 BPM

## 2023-03-14 DIAGNOSIS — K57.92 DIVERTICULITIS: Primary | ICD-10-CM

## 2023-03-14 LAB
ALBUMIN SERPL-MCNC: 3.8 G/DL (ref 3.5–5)
ALBUMIN/GLOB SERPL: 1.1 (ref 1.1–2.2)
ALP SERPL-CCNC: 102 U/L (ref 45–117)
ALT SERPL-CCNC: 19 U/L (ref 12–78)
ANION GAP SERPL CALC-SCNC: 13 MMOL/L (ref 5–15)
APPEARANCE UR: CLEAR
AST SERPL-CCNC: 19 U/L (ref 15–37)
BACTERIA URNS QL MICRO: NEGATIVE /HPF
BASOPHILS # BLD: 0.1 K/UL (ref 0–0.1)
BASOPHILS NFR BLD: 1 % (ref 0–1)
BILIRUB SERPL-MCNC: 0.6 MG/DL (ref 0.2–1)
BILIRUB UR QL: NEGATIVE
BUN SERPL-MCNC: 18 MG/DL (ref 6–20)
BUN/CREAT SERPL: 15 (ref 12–20)
CALCIUM SERPL-MCNC: 8.6 MG/DL (ref 8.5–10.1)
CHLORIDE SERPL-SCNC: 102 MMOL/L (ref 97–108)
CO2 SERPL-SCNC: 25 MMOL/L (ref 21–32)
COLOR UR: ABNORMAL
CREAT SERPL-MCNC: 1.23 MG/DL (ref 0.7–1.3)
DIFFERENTIAL METHOD BLD: NORMAL
EOSINOPHIL # BLD: 0.3 K/UL (ref 0–0.4)
EOSINOPHIL NFR BLD: 3 % (ref 0–7)
EPITH CASTS URNS QL MICRO: ABNORMAL /LPF
ERYTHROCYTE [DISTWIDTH] IN BLOOD BY AUTOMATED COUNT: 12.8 % (ref 11.5–14.5)
GLOBULIN SER CALC-MCNC: 3.4 G/DL (ref 2–4)
GLUCOSE SERPL-MCNC: 187 MG/DL (ref 65–100)
GLUCOSE UR STRIP.AUTO-MCNC: NEGATIVE MG/DL
HCT VFR BLD AUTO: 47.8 % (ref 36.6–50.3)
HGB BLD-MCNC: 15.8 G/DL (ref 12.1–17)
HGB UR QL STRIP: ABNORMAL
IMM GRANULOCYTES # BLD AUTO: 0 K/UL (ref 0–0.04)
IMM GRANULOCYTES NFR BLD AUTO: 0 % (ref 0–0.5)
KETONES UR QL STRIP.AUTO: NEGATIVE MG/DL
LEUKOCYTE ESTERASE UR QL STRIP.AUTO: NEGATIVE
LIPASE SERPL-CCNC: 333 U/L (ref 73–393)
LYMPHOCYTES # BLD: 2.2 K/UL (ref 0.8–3.5)
LYMPHOCYTES NFR BLD: 20 % (ref 12–49)
MCH RBC QN AUTO: 30.6 PG (ref 26–34)
MCHC RBC AUTO-ENTMCNC: 33.1 G/DL (ref 30–36.5)
MCV RBC AUTO: 92.6 FL (ref 80–99)
MONOCYTES # BLD: 0.6 K/UL (ref 0–1)
MONOCYTES NFR BLD: 5 % (ref 5–13)
NEUTS SEG # BLD: 7.8 K/UL (ref 1.8–8)
NEUTS SEG NFR BLD: 71 % (ref 32–75)
NITRITE UR QL STRIP.AUTO: NEGATIVE
NRBC # BLD: 0 K/UL (ref 0–0.01)
NRBC BLD-RTO: 0 PER 100 WBC
PH UR STRIP: 7 (ref 5–8)
PLATELET # BLD AUTO: 297 K/UL (ref 150–400)
PMV BLD AUTO: 9.9 FL (ref 8.9–12.9)
POTASSIUM SERPL-SCNC: 3.9 MMOL/L (ref 3.5–5.1)
PROT SERPL-MCNC: 7.2 G/DL (ref 6.4–8.2)
PROT UR STRIP-MCNC: NEGATIVE MG/DL
RBC # BLD AUTO: 5.16 M/UL (ref 4.1–5.7)
RBC #/AREA URNS HPF: ABNORMAL /HPF (ref 0–5)
SODIUM SERPL-SCNC: 140 MMOL/L (ref 136–145)
SP GR UR REFRACTOMETRY: 1.01 (ref 1–1.03)
UROBILINOGEN UR QL STRIP.AUTO: 1 EU/DL (ref 0.2–1)
WBC # BLD AUTO: 10.9 K/UL (ref 4.1–11.1)
WBC URNS QL MICRO: ABNORMAL /HPF (ref 0–4)

## 2023-03-14 PROCEDURE — 83690 ASSAY OF LIPASE: CPT

## 2023-03-14 PROCEDURE — 81001 URINALYSIS AUTO W/SCOPE: CPT

## 2023-03-14 PROCEDURE — 80053 COMPREHEN METABOLIC PANEL: CPT

## 2023-03-14 PROCEDURE — 36415 COLL VENOUS BLD VENIPUNCTURE: CPT

## 2023-03-14 PROCEDURE — 85025 COMPLETE CBC W/AUTO DIFF WBC: CPT

## 2023-03-14 PROCEDURE — 74011000636 HC RX REV CODE- 636: Performed by: EMERGENCY MEDICINE

## 2023-03-14 PROCEDURE — 99285 EMERGENCY DEPT VISIT HI MDM: CPT

## 2023-03-14 PROCEDURE — 74177 CT ABD & PELVIS W/CONTRAST: CPT

## 2023-03-14 RX ORDER — AMOXICILLIN AND CLAVULANATE POTASSIUM 875; 125 MG/1; MG/1
1 TABLET, FILM COATED ORAL 2 TIMES DAILY
Qty: 14 TABLET | Refills: 0 | Status: SHIPPED | OUTPATIENT
Start: 2023-03-14 | End: 2023-03-21

## 2023-03-14 RX ORDER — ALBUTEROL SULFATE 0.83 MG/ML
2.5 SOLUTION RESPIRATORY (INHALATION)
Qty: 20 EACH | Refills: 0 | Status: SHIPPED | OUTPATIENT
Start: 2023-03-14 | End: 2023-03-19

## 2023-03-14 RX ORDER — KETOROLAC TROMETHAMINE 30 MG/ML
15 INJECTION, SOLUTION INTRAMUSCULAR; INTRAVENOUS
Status: DISCONTINUED | OUTPATIENT
Start: 2023-03-14 | End: 2023-03-14

## 2023-03-14 RX ADMIN — IOPAMIDOL 100 ML: 755 INJECTION, SOLUTION INTRAVENOUS at 14:55

## 2023-03-14 NOTE — DISCHARGE INSTRUCTIONS
Return to the ER with any new or worsening symptoms. In the meantime please take your antibiotics as directed and follow-up with your primary care doctor.

## 2023-03-14 NOTE — ED NOTES
Pt ambulatory out of ED with discharge instructions and prescriptions in hand given by Dr. Racquel Mendez; pt verbalized understanding of discharge paperwork and time allotted for questions. VSS. Pt alert and oriented.

## 2023-03-14 NOTE — ED TRIAGE NOTES
57year old male pt comes to the ED via POV for a CC Lower left abdominal pain. Pt states that he came here from his doctor to be evaluated for diverticulitis. Pt does have IBS. Pt is A&Ox4.

## 2023-03-14 NOTE — ED PROVIDER NOTES
HPI   26-year-old male with a past medical history of COPD, coronary artery disease, hyperlipidemia, and GERD who presents to the emergency department due to left-sided abdominal pain. Patient symptoms started late last week. He says he has fairly constant left lower quadrant pain that feels dull in nature. No exacerbating or alleviating factors. No vomiting or diarrhea. No blood in his stool. He says he has a history of IBS and has never had symptoms like this in the past.  No fever. No urinary symptoms. He followed up with his PCP today and due to his abdominal exam he was told to go to the ER for potential diverticulitis. Past Medical History:   Diagnosis Date    Adverse effect of anesthesia     \"HEART STOPPED\" DURING ACHILLES TENDON REPAIR - WAS TOLD HE NEEDED TO GO SEE HIS HEART DOCTOR    Arthritis     osteo-- primarily affecting back, hands    Asthma     last exacerbation 1 yr ago. Uses albuterol rarely only. Never hospitalized    Chronic obstructive pulmonary disease (HCC)     Chronic pain     BACK/HIPS    Depression     Eczema     GERD (gastroesophageal reflux disease)     occasional only    Heart attack [facility]) 2006    Dr Jessica Young;  stents x2.  Last stress test 2009--WNL    Hypercholesteremia     IBS (irritable bowel syndrome)     Ill-defined condition     OBESE PER PT    Ill-defined condition     ATOPIC DERMATITIS       Past Surgical History:   Procedure Laterality Date    CARDIAC SURG PROCEDURE UNLIST  2006    stent placed s/p MI    HX ORTHOPAEDIC  2008    Rt knee reconstruction    HX ORTHOPAEDIC      RT ACHILLES TENDON REPAIR    HX OTHER SURGICAL      rectal surgery    HX TONSILLECTOMY           Family History:   Family history unknown: Yes       Social History     Socioeconomic History    Marital status: LEGALLY      Spouse name: Not on file    Number of children: Not on file    Years of education: Not on file    Highest education level: Not on file   Occupational History    Not on file   Tobacco Use    Smoking status: Every Day     Packs/day: 0.50     Types: Cigarettes    Smokeless tobacco: Never   Substance and Sexual Activity    Alcohol use: No    Drug use: Yes     Types: Marijuana    Sexual activity: Yes     Partners: Female   Other Topics Concern    Not on file   Social History Narrative    Not on file     Social Determinants of Health     Financial Resource Strain: Not on file   Food Insecurity: Not on file   Transportation Needs: Not on file   Physical Activity: Not on file   Stress: Not on file   Social Connections: Not on file   Intimate Partner Violence: Not on file   Housing Stability: Not on file         ALLERGIES: Lisinopril, Aspirin, Codeine, and Hydrocodone    Review of Systems  A complete review of systems was performed and all systems reviewed are negative as otherwise documented in HPI  Vitals:    03/14/23 1340 03/14/23 1515 03/14/23 1517 03/14/23 1530   BP: (!) 149/105 (!) 136/100 (!) 136/100    Pulse: 95 84     Resp: 12 20     Temp: 98.3 °F (36.8 °C) 97.9 °F (36.6 °C)     SpO2: 99% 93% 93% 93%   Weight: 96.5 kg (212 lb 11.9 oz)      Height: 5' 9\" (1.753 m)               Physical Exam  Constitutional:       Comments: Not acutely distressed or ill-appearing   HENT:      Mouth/Throat:      Comments: Moist mucous membranes  Eyes:      General: No scleral icterus. Extraocular Movements: Extraocular movements intact. Cardiovascular:      Rate and Rhythm: Normal rate and regular rhythm. Heart sounds: No murmur heard. Pulmonary:      Effort: Pulmonary effort is normal. No respiratory distress. Breath sounds: Normal breath sounds. No wheezing or rales. Abdominal:      Comments: Soft. No distention. Left lower quadrant tenderness to palpation with guarding. No CVA tenderness   Skin:     General: Skin is warm and dry. Neurological:      Comments: Awake and alert.   GCS 15        Medical Decision Making  Amount and/or Complexity of Data Reviewed  Labs: ordered. Radiology: ordered. Risk  Prescription drug management. 58-year-old man presenting with the above chief complaint. Vital signs are stable. Not acutely distressed on exam.  Left lower quadrant tenderness with guarding present. Will proceed with labs and CT. He is declining any medication for pain. Labs and urinalysis are reassuring. CT scan shows pericolic inflammation representative of possible epiploic appendagitis, omental infarct or possibly mild early diverticulitis. I think it be worth treating the patient so I be given a prescription for Augmentin. Continues to appear well on reexamination.   Patient discharged stable condition       Procedures

## 2023-03-22 ENCOUNTER — HOSPITAL ENCOUNTER (EMERGENCY)
Age: 63
Discharge: HOME | DRG: 254 | End: 2023-03-22
Attending: EMERGENCY MEDICINE
Payer: MEDICAID

## 2023-03-22 ENCOUNTER — APPOINTMENT (OUTPATIENT)
Dept: CT IMAGING | Age: 63
DRG: 254 | End: 2023-03-22
Attending: EMERGENCY MEDICINE
Payer: MEDICAID

## 2023-03-22 VITALS
DIASTOLIC BLOOD PRESSURE: 53 MMHG | RESPIRATION RATE: 22 BRPM | WEIGHT: 211.86 LBS | BODY MASS INDEX: 31.29 KG/M2 | OXYGEN SATURATION: 92 % | SYSTOLIC BLOOD PRESSURE: 93 MMHG | HEART RATE: 70 BPM | TEMPERATURE: 97.7 F

## 2023-03-22 DIAGNOSIS — K57.92 DIVERTICULITIS: ICD-10-CM

## 2023-03-22 DIAGNOSIS — R10.32 ABDOMINAL PAIN, LLQ (LEFT LOWER QUADRANT): Primary | ICD-10-CM

## 2023-03-22 DIAGNOSIS — E86.0 DEHYDRATION: ICD-10-CM

## 2023-03-22 DIAGNOSIS — K58.0 IRRITABLE BOWEL SYNDROME WITH DIARRHEA: ICD-10-CM

## 2023-03-22 LAB
ALBUMIN SERPL-MCNC: 3.8 G/DL (ref 3.5–5)
ALBUMIN/GLOB SERPL: 1.2 (ref 1.1–2.2)
ALP SERPL-CCNC: 103 U/L (ref 45–117)
ALT SERPL-CCNC: 22 U/L (ref 12–78)
ANION GAP SERPL CALC-SCNC: 6 MMOL/L (ref 5–15)
APPEARANCE UR: CLEAR
AST SERPL-CCNC: 16 U/L (ref 15–37)
BACTERIA URNS QL MICRO: NEGATIVE /HPF
BASOPHILS # BLD: 0 K/UL (ref 0–0.1)
BASOPHILS NFR BLD: 0 % (ref 0–1)
BILIRUB SERPL-MCNC: 0.4 MG/DL (ref 0.2–1)
BILIRUB UR QL: NEGATIVE
BUN SERPL-MCNC: 19 MG/DL (ref 6–20)
BUN/CREAT SERPL: 15 (ref 12–20)
CALCIUM SERPL-MCNC: 8.4 MG/DL (ref 8.5–10.1)
CHLORIDE SERPL-SCNC: 104 MMOL/L (ref 97–108)
CO2 SERPL-SCNC: 31 MMOL/L (ref 21–32)
COLOR UR: ABNORMAL
CREAT SERPL-MCNC: 1.28 MG/DL (ref 0.7–1.3)
DIFFERENTIAL METHOD BLD: ABNORMAL
EOSINOPHIL # BLD: 0.5 K/UL (ref 0–0.4)
EOSINOPHIL NFR BLD: 3 % (ref 0–7)
EPITH CASTS URNS QL MICRO: ABNORMAL /LPF
ERYTHROCYTE [DISTWIDTH] IN BLOOD BY AUTOMATED COUNT: 12.6 % (ref 11.5–14.5)
GLOBULIN SER CALC-MCNC: 3.3 G/DL (ref 2–4)
GLUCOSE SERPL-MCNC: 157 MG/DL (ref 65–100)
GLUCOSE UR STRIP.AUTO-MCNC: NEGATIVE MG/DL
HCT VFR BLD AUTO: 48.6 % (ref 36.6–50.3)
HGB BLD-MCNC: 15.8 G/DL (ref 12.1–17)
HGB UR QL STRIP: ABNORMAL
IMM GRANULOCYTES # BLD AUTO: 0.1 K/UL (ref 0–0.04)
IMM GRANULOCYTES NFR BLD AUTO: 0 % (ref 0–0.5)
KETONES UR QL STRIP.AUTO: ABNORMAL MG/DL
LEUKOCYTE ESTERASE UR QL STRIP.AUTO: NEGATIVE
LYMPHOCYTES # BLD: 2.4 K/UL (ref 0.8–3.5)
LYMPHOCYTES NFR BLD: 14 % (ref 12–49)
MCH RBC QN AUTO: 30.4 PG (ref 26–34)
MCHC RBC AUTO-ENTMCNC: 32.5 G/DL (ref 30–36.5)
MCV RBC AUTO: 93.5 FL (ref 80–99)
MONOCYTES # BLD: 1.1 K/UL (ref 0–1)
MONOCYTES NFR BLD: 7 % (ref 5–13)
NEUTS SEG # BLD: 12.8 K/UL (ref 1.8–8)
NEUTS SEG NFR BLD: 76 % (ref 32–75)
NITRITE UR QL STRIP.AUTO: NEGATIVE
NRBC # BLD: 0 K/UL (ref 0–0.01)
NRBC BLD-RTO: 0 PER 100 WBC
PH UR STRIP: 5.5 (ref 5–8)
PLATELET # BLD AUTO: 308 K/UL (ref 150–400)
PMV BLD AUTO: 9.9 FL (ref 8.9–12.9)
POTASSIUM SERPL-SCNC: 4.4 MMOL/L (ref 3.5–5.1)
PROT SERPL-MCNC: 7.1 G/DL (ref 6.4–8.2)
PROT UR STRIP-MCNC: NEGATIVE MG/DL
RBC # BLD AUTO: 5.2 M/UL (ref 4.1–5.7)
RBC #/AREA URNS HPF: ABNORMAL /HPF (ref 0–5)
SODIUM SERPL-SCNC: 141 MMOL/L (ref 136–145)
SP GR UR REFRACTOMETRY: 1.02 (ref 1–1.03)
UR CULT HOLD, URHOLD: NORMAL
UROBILINOGEN UR QL STRIP.AUTO: 0.2 EU/DL (ref 0.2–1)
WBC # BLD AUTO: 16.8 K/UL (ref 4.1–11.1)
WBC URNS QL MICRO: ABNORMAL /HPF (ref 0–4)

## 2023-03-22 PROCEDURE — 74011250636 HC RX REV CODE- 250/636: Performed by: EMERGENCY MEDICINE

## 2023-03-22 PROCEDURE — 81001 URINALYSIS AUTO W/SCOPE: CPT

## 2023-03-22 PROCEDURE — 74176 CT ABD & PELVIS W/O CONTRAST: CPT

## 2023-03-22 PROCEDURE — 85025 COMPLETE CBC W/AUTO DIFF WBC: CPT

## 2023-03-22 PROCEDURE — 80053 COMPREHEN METABOLIC PANEL: CPT

## 2023-03-22 PROCEDURE — 36415 COLL VENOUS BLD VENIPUNCTURE: CPT

## 2023-03-22 RX ORDER — HYDROMORPHONE HYDROCHLORIDE 1 MG/ML
1 INJECTION, SOLUTION INTRAMUSCULAR; INTRAVENOUS; SUBCUTANEOUS ONCE
Status: COMPLETED | OUTPATIENT
Start: 2023-03-22 | End: 2023-03-22

## 2023-03-22 RX ORDER — KETOROLAC TROMETHAMINE 30 MG/ML
30 INJECTION, SOLUTION INTRAMUSCULAR; INTRAVENOUS ONCE
Status: COMPLETED | OUTPATIENT
Start: 2023-03-22 | End: 2023-03-22

## 2023-03-22 RX ORDER — DICYCLOMINE HYDROCHLORIDE 10 MG/5ML
20 SOLUTION ORAL EVERY 6 HOURS
Qty: 200 ML | Refills: 0 | Status: SHIPPED | OUTPATIENT
Start: 2023-03-22 | End: 2023-03-24

## 2023-03-22 RX ORDER — DICYCLOMINE HYDROCHLORIDE 10 MG/ML
20 INJECTION INTRAMUSCULAR
Status: COMPLETED | OUTPATIENT
Start: 2023-03-22 | End: 2023-03-22

## 2023-03-22 RX ORDER — KETOROLAC TROMETHAMINE 10 MG/1
10 TABLET, FILM COATED ORAL
Qty: 15 TABLET | Refills: 0 | Status: SHIPPED | OUTPATIENT
Start: 2023-03-22

## 2023-03-22 RX ADMIN — DICYCLOMINE HYDROCHLORIDE 20 MG: 20 INJECTION, SOLUTION INTRAMUSCULAR at 06:27

## 2023-03-22 RX ADMIN — HYDROMORPHONE HYDROCHLORIDE 1 MG: 1 INJECTION, SOLUTION INTRAMUSCULAR; INTRAVENOUS; SUBCUTANEOUS at 04:39

## 2023-03-22 RX ADMIN — SODIUM CHLORIDE 1000 ML: 9 INJECTION, SOLUTION INTRAVENOUS at 06:29

## 2023-03-22 RX ADMIN — SODIUM CHLORIDE 1000 ML: 9 INJECTION, SOLUTION INTRAVENOUS at 04:40

## 2023-03-22 RX ADMIN — KETOROLAC TROMETHAMINE 30 MG: 30 INJECTION, SOLUTION INTRAMUSCULAR at 06:27

## 2023-03-22 NOTE — ED NOTES
Bedside and verbal shift change report given to Gavin Mosher RN (oncoming nurse) by Efrain Sloan RN (offgoing nurse). Report included the following information ED Summary.

## 2023-03-22 NOTE — ED TRIAGE NOTES
Pt complaining of severe abdominal pain with diarrhea. States he was diagnosed with diverticulitis last Tuesday. Given antibiotics and states he took all of those.   Pain was relieved with meds but got worse approx 2 hours ago

## 2023-03-22 NOTE — ED NOTES
Discharge instructions reviewed with pt and copy given along with RX by this RN. Pt educated on follow up with PCP and GI MD. Pt verbalized understanding of all education and is ambulatory from ED in no sign of distress.

## 2023-03-22 NOTE — ED PROVIDER NOTES
77-year-old male with a past medical history significant for coronary artery disease, IBS, GERD, COPD presents with complaints of lower abdominal pain starting early this morning with diarrhea. Patient reports he was recently diagnosed with diverticulitis last week and has been on Augmentin for the past week. Patient reports he was doing well and then pain returned this morning with greater severity. Associated with nausea. Denies vomiting. Denies trauma. Denies fever, chills, chest pain, shortness of breath. Denies any previous abdominal surgery    Primary care-[facility]  Regular tobacco use, occasional marijuana use, denies alcohol use       Past Medical History:   Diagnosis Date    Adverse effect of anesthesia     \"HEART STOPPED\" DURING ACHILLES TENDON REPAIR - WAS TOLD HE NEEDED TO GO SEE HIS HEART DOCTOR    Arthritis     osteo-- primarily affecting back, hands    Asthma     last exacerbation 1 yr ago. Uses albuterol rarely only. Never hospitalized    Chronic obstructive pulmonary disease (HCC)     Chronic pain     BACK/HIPS    Depression     Eczema     GERD (gastroesophageal reflux disease)     occasional only    Heart attack [facility]) 2006    Dr Erica Salazar;  stents x2.  Last stress test 2009--WNL    Hypercholesteremia     IBS (irritable bowel syndrome)     Ill-defined condition     OBESE PER PT    Ill-defined condition     ATOPIC DERMATITIS       Past Surgical History:   Procedure Laterality Date    HX ORTHOPAEDIC  2008    Rt knee reconstruction    HX ORTHOPAEDIC      RT ACHILLES TENDON REPAIR    HX OTHER SURGICAL      rectal surgery    HX TONSILLECTOMY      [facility] UNLISTED PROCEDURE CARDIAC SURGERY  2006    stent placed s/p MI         Family History:   Family history unknown: Yes       Social History     Socioeconomic History    Marital status: LEGALLY      Spouse name: Not on file    Number of children: Not on file    Years of education: Not on file    Highest education level: Not on file Occupational History    Not on file   Tobacco Use    Smoking status: Every Day     Packs/day: 0.50     Types: Cigarettes    Smokeless tobacco: Never   Substance and Sexual Activity    Alcohol use: No    Drug use: Yes     Types: Marijuana    Sexual activity: Yes     Partners: Female   Other Topics Concern    Not on file   Social History Narrative    Not on file     Social Determinants of Health     Financial Resource Strain: Not on file   Food Insecurity: Not on file   Transportation Needs: Not on file   Physical Activity: Not on file   Stress: Not on file   Social Connections: Not on file   Intimate Partner Violence: Not on file   Housing Stability: Not on file         ALLERGIES: Lisinopril, Aspirin, Codeine, and Hydrocodone    Review of Systems   Constitutional:  Negative for chills and fever. HENT:  Negative for congestion, nosebleeds and sore throat. Eyes:  Negative for pain and discharge. Respiratory:  Negative for cough and shortness of breath. Cardiovascular:  Negative for chest pain and palpitations. Gastrointestinal:  Positive for abdominal pain, diarrhea and nausea. Negative for constipation and vomiting. Genitourinary:  Negative for decreased urine volume, dysuria, flank pain and urgency. Musculoskeletal:  Negative for gait problem and myalgias. Skin:  Negative for rash and wound. Neurological:  Negative for seizures and syncope. Hematological:  Does not bruise/bleed easily. Psychiatric/Behavioral:  Negative for confusion, self-injury and suicidal ideas. Vitals:    03/22/23 0435   BP: (!) 144/97   Pulse: 70   Resp: 22   Temp: 97.7 °F (36.5 °C)   SpO2: 93%   Weight: 96.1 kg (211 lb 13.8 oz)            Physical Exam  Vitals and nursing note reviewed. Constitutional:       Appearance: He is well-developed. HENT:      Head: Normocephalic and atraumatic. Eyes:      Pupils: Pupils are equal, round, and reactive to light.    Cardiovascular:      Rate and Rhythm: Normal rate and regular rhythm. Heart sounds: Normal heart sounds. Pulmonary:      Effort: Pulmonary effort is normal. No respiratory distress. Breath sounds: Normal breath sounds. No wheezing. Abdominal:      General: Bowel sounds are normal.      Palpations: Abdomen is soft. Tenderness: There is abdominal tenderness in the right lower quadrant and left lower quadrant. There is guarding. Musculoskeletal:         General: Normal range of motion. Cervical back: Normal range of motion and neck supple. Skin:     General: Skin is warm and dry. Neurological:      Mental Status: He is alert and oriented to person, place, and time. Psychiatric:         Behavior: Behavior normal.        Medical Decision Making  38-year-old male with history of IBS, GERD, recent diverticulitis, COPD, coronary disease presents with complaints of lower abdominal pain associated with diarrhea starting this morning after recent diagnosis of diverticulitis last week with improvement. Patient appears uncomfortable, no respiratory distress, nontoxic, hemodynamically stable, afebrile, abdomen with voluntary guarding, bilateral lower quadrant tenderness to palpation. Plan-pain control, IV fluid hydration, CBC/CMP, UA, CT abdomen pelvis. Clinical concern for worsening diverticulitis, abscess, perforation. CT shows continued stable stranding ant descending colon. No acute changes. Labs noted for slightly eleavted wbc at 16.8    Amount and/or Complexity of Data Reviewed  Labs: ordered. Radiology: ordered. Risk  Prescription drug management. Procedures        Pt reports feeling improved. Discussed lab and CT results with patient, agreeable with plan for dc and du with pmd. Advised to continue abx prescribed. 7:07 PM  Patient's results have been reviewed with them.   Patient and/or family have verbally conveyed their understanding and agreement of the patient's signs, symptoms, diagnosis, treatment and prognosis and additionally agree to follow up as recommended or return to the Emergency Room should their condition change prior to follow-up. Discharge instructions have also been provided to the patient with some educational information regarding their diagnosis as well a list of reasons why they would want to return to the ER prior to their follow-up appointment should their condition change.

## 2023-03-24 ENCOUNTER — HOSPITAL ENCOUNTER (INPATIENT)
Age: 63
LOS: 4 days | Discharge: HOME | DRG: 254 | End: 2023-03-28
Attending: EMERGENCY MEDICINE | Admitting: FAMILY MEDICINE
Payer: MEDICAID

## 2023-03-24 ENCOUNTER — APPOINTMENT (OUTPATIENT)
Dept: CT IMAGING | Age: 63
DRG: 254 | End: 2023-03-24
Attending: EMERGENCY MEDICINE
Payer: MEDICAID

## 2023-03-24 DIAGNOSIS — K57.92 DIVERTICULITIS: Primary | ICD-10-CM

## 2023-03-24 DIAGNOSIS — R10.32 ABDOMINAL PAIN, LLQ (LEFT LOWER QUADRANT): ICD-10-CM

## 2023-03-24 DIAGNOSIS — K62.5 RECTAL BLEEDING: ICD-10-CM

## 2023-03-24 LAB
ABO + RH BLD: NORMAL
ALBUMIN SERPL-MCNC: 3.5 G/DL (ref 3.5–5)
ALBUMIN/GLOB SERPL: 1.1 (ref 1.1–2.2)
ALP SERPL-CCNC: 85 U/L (ref 45–117)
ALT SERPL-CCNC: 25 U/L (ref 12–78)
ANION GAP SERPL CALC-SCNC: 1 MMOL/L (ref 5–15)
AST SERPL-CCNC: 20 U/L (ref 15–37)
BASOPHILS # BLD: 0 K/UL (ref 0–0.1)
BASOPHILS NFR BLD: 0 % (ref 0–1)
BILIRUB SERPL-MCNC: 0.6 MG/DL (ref 0.2–1)
BLOOD GROUP ANTIBODIES SERPL: NORMAL
BUN SERPL-MCNC: 13 MG/DL (ref 6–20)
BUN/CREAT SERPL: 12 (ref 12–20)
CALCIUM SERPL-MCNC: 8.7 MG/DL (ref 8.5–10.1)
CHLORIDE SERPL-SCNC: 107 MMOL/L (ref 97–108)
CO2 SERPL-SCNC: 28 MMOL/L (ref 21–32)
COMMENT, HOLDF: NORMAL
CREAT SERPL-MCNC: 1.06 MG/DL (ref 0.7–1.3)
DIFFERENTIAL METHOD BLD: NORMAL
EOSINOPHIL # BLD: 0.4 K/UL (ref 0–0.4)
EOSINOPHIL NFR BLD: 4 % (ref 0–7)
ERYTHROCYTE [DISTWIDTH] IN BLOOD BY AUTOMATED COUNT: 12.8 % (ref 11.5–14.5)
GLOBULIN SER CALC-MCNC: 3.2 G/DL (ref 2–4)
GLUCOSE SERPL-MCNC: 106 MG/DL (ref 65–100)
HCT VFR BLD AUTO: 46.9 % (ref 36.6–50.3)
HGB BLD-MCNC: 15.2 G/DL (ref 12.1–17)
IMM GRANULOCYTES # BLD AUTO: 0 K/UL (ref 0–0.04)
IMM GRANULOCYTES NFR BLD AUTO: 0 % (ref 0–0.5)
INR PPP: 1 (ref 0.9–1.1)
LACTATE BLD-SCNC: 1.05 MMOL/L (ref 0.4–2)
LYMPHOCYTES # BLD: 2.7 K/UL (ref 0.8–3.5)
LYMPHOCYTES NFR BLD: 29 % (ref 12–49)
MCH RBC QN AUTO: 30.4 PG (ref 26–34)
MCHC RBC AUTO-ENTMCNC: 32.4 G/DL (ref 30–36.5)
MCV RBC AUTO: 93.8 FL (ref 80–99)
MONOCYTES # BLD: 0.7 K/UL (ref 0–1)
MONOCYTES NFR BLD: 8 % (ref 5–13)
NEUTS SEG # BLD: 5.3 K/UL (ref 1.8–8)
NEUTS SEG NFR BLD: 59 % (ref 32–75)
NRBC # BLD: 0 K/UL (ref 0–0.01)
NRBC BLD-RTO: 0 PER 100 WBC
PLATELET # BLD AUTO: 310 K/UL (ref 150–400)
PMV BLD AUTO: 9.8 FL (ref 8.9–12.9)
POTASSIUM SERPL-SCNC: 4.1 MMOL/L (ref 3.5–5.1)
PROT SERPL-MCNC: 6.7 G/DL (ref 6.4–8.2)
PROTHROMBIN TIME: 10.5 SEC (ref 9–11.1)
RBC # BLD AUTO: 5 M/UL (ref 4.1–5.7)
SAMPLES BEING HELD,HOLD: NORMAL
SODIUM SERPL-SCNC: 136 MMOL/L (ref 136–145)
SPECIMEN EXP DATE BLD: NORMAL
WBC # BLD AUTO: 9.1 K/UL (ref 4.1–11.1)

## 2023-03-24 PROCEDURE — 74011000258 HC RX REV CODE- 258: Performed by: EMERGENCY MEDICINE

## 2023-03-24 PROCEDURE — 80053 COMPREHEN METABOLIC PANEL: CPT

## 2023-03-24 PROCEDURE — 96375 TX/PRO/DX INJ NEW DRUG ADDON: CPT

## 2023-03-24 PROCEDURE — 74177 CT ABD & PELVIS W/CONTRAST: CPT

## 2023-03-24 PROCEDURE — 74011250636 HC RX REV CODE- 250/636: Performed by: EMERGENCY MEDICINE

## 2023-03-24 PROCEDURE — 74011250636 HC RX REV CODE- 250/636: Performed by: FAMILY MEDICINE

## 2023-03-24 PROCEDURE — 36415 COLL VENOUS BLD VENIPUNCTURE: CPT

## 2023-03-24 PROCEDURE — 74011250636 HC RX REV CODE- 250/636

## 2023-03-24 PROCEDURE — 74011000636 HC RX REV CODE- 636: Performed by: RADIOLOGY

## 2023-03-24 PROCEDURE — 74011000258 HC RX REV CODE- 258: Performed by: FAMILY MEDICINE

## 2023-03-24 PROCEDURE — 74011250637 HC RX REV CODE- 250/637: Performed by: FAMILY MEDICINE

## 2023-03-24 PROCEDURE — 85610 PROTHROMBIN TIME: CPT

## 2023-03-24 PROCEDURE — 96365 THER/PROPH/DIAG IV INF INIT: CPT

## 2023-03-24 PROCEDURE — 86900 BLOOD TYPING SEROLOGIC ABO: CPT

## 2023-03-24 PROCEDURE — 99285 EMERGENCY DEPT VISIT HI MDM: CPT

## 2023-03-24 PROCEDURE — 65270000032 HC RM SEMIPRIVATE

## 2023-03-24 PROCEDURE — 83605 ASSAY OF LACTIC ACID: CPT

## 2023-03-24 PROCEDURE — 85025 COMPLETE CBC W/AUTO DIFF WBC: CPT

## 2023-03-24 RX ORDER — ONDANSETRON 2 MG/ML
4 INJECTION INTRAMUSCULAR; INTRAVENOUS
Status: DISCONTINUED | OUTPATIENT
Start: 2023-03-24 | End: 2023-03-28 | Stop reason: HOSPADM

## 2023-03-24 RX ORDER — SODIUM CHLORIDE 0.9 % (FLUSH) 0.9 %
5-40 SYRINGE (ML) INJECTION AS NEEDED
Status: DISCONTINUED | OUTPATIENT
Start: 2023-03-24 | End: 2023-03-28 | Stop reason: HOSPADM

## 2023-03-24 RX ORDER — FLUTICASONE PROPIONATE 50 MCG
1 SPRAY, SUSPENSION (ML) NASAL
Status: DISCONTINUED | OUTPATIENT
Start: 2023-03-24 | End: 2023-03-28 | Stop reason: HOSPADM

## 2023-03-24 RX ORDER — LOSARTAN POTASSIUM 50 MG/1
50 TABLET ORAL DAILY
Status: DISCONTINUED | OUTPATIENT
Start: 2023-03-25 | End: 2023-03-28 | Stop reason: HOSPADM

## 2023-03-24 RX ORDER — ROSUVASTATIN CALCIUM 10 MG/1
30 TABLET, COATED ORAL
Status: DISCONTINUED | OUTPATIENT
Start: 2023-03-24 | End: 2023-03-28 | Stop reason: HOSPADM

## 2023-03-24 RX ORDER — ONDANSETRON 2 MG/ML
INJECTION INTRAMUSCULAR; INTRAVENOUS
Status: COMPLETED
Start: 2023-03-24 | End: 2023-03-24

## 2023-03-24 RX ORDER — SPIRONOLACTONE 25 MG/1
50 TABLET ORAL DAILY
Status: DISCONTINUED | OUTPATIENT
Start: 2023-03-25 | End: 2023-03-28 | Stop reason: HOSPADM

## 2023-03-24 RX ORDER — OMEPRAZOLE 40 MG/1
40 CAPSULE, DELAYED RELEASE ORAL DAILY
COMMUNITY

## 2023-03-24 RX ORDER — SODIUM CHLORIDE 0.9 % (FLUSH) 0.9 %
5-40 SYRINGE (ML) INJECTION EVERY 8 HOURS
Status: DISCONTINUED | OUTPATIENT
Start: 2023-03-24 | End: 2023-03-28 | Stop reason: HOSPADM

## 2023-03-24 RX ORDER — POLYETHYLENE GLYCOL 3350 17 G/17G
17 POWDER, FOR SOLUTION ORAL DAILY PRN
Status: DISCONTINUED | OUTPATIENT
Start: 2023-03-24 | End: 2023-03-28 | Stop reason: HOSPADM

## 2023-03-24 RX ORDER — PROCHLORPERAZINE EDISYLATE 5 MG/ML
5 INJECTION INTRAMUSCULAR; INTRAVENOUS ONCE
Status: COMPLETED | OUTPATIENT
Start: 2023-03-24 | End: 2023-03-24

## 2023-03-24 RX ORDER — ONDANSETRON 2 MG/ML
4 INJECTION INTRAMUSCULAR; INTRAVENOUS
Status: COMPLETED | OUTPATIENT
Start: 2023-03-24 | End: 2023-03-24

## 2023-03-24 RX ORDER — CITALOPRAM 20 MG/1
40 TABLET, FILM COATED ORAL DAILY
Status: DISCONTINUED | OUTPATIENT
Start: 2023-03-25 | End: 2023-03-28 | Stop reason: HOSPADM

## 2023-03-24 RX ORDER — PANTOPRAZOLE SODIUM 20 MG/1
20 TABLET, DELAYED RELEASE ORAL
Status: DISCONTINUED | OUTPATIENT
Start: 2023-03-25 | End: 2023-03-28 | Stop reason: HOSPADM

## 2023-03-24 RX ORDER — ACETAMINOPHEN 325 MG/1
650 TABLET ORAL
Status: DISCONTINUED | OUTPATIENT
Start: 2023-03-24 | End: 2023-03-28 | Stop reason: HOSPADM

## 2023-03-24 RX ORDER — ACETAMINOPHEN 650 MG/1
650 SUPPOSITORY RECTAL
Status: DISCONTINUED | OUTPATIENT
Start: 2023-03-24 | End: 2023-03-28 | Stop reason: HOSPADM

## 2023-03-24 RX ORDER — HYDROMORPHONE HYDROCHLORIDE 1 MG/ML
1 INJECTION, SOLUTION INTRAMUSCULAR; INTRAVENOUS; SUBCUTANEOUS ONCE
Status: COMPLETED | OUTPATIENT
Start: 2023-03-24 | End: 2023-03-24

## 2023-03-24 RX ORDER — CARVEDILOL 6.25 MG/1
6.25 TABLET ORAL 2 TIMES DAILY WITH MEALS
Status: DISCONTINUED | OUTPATIENT
Start: 2023-03-25 | End: 2023-03-28 | Stop reason: HOSPADM

## 2023-03-24 RX ORDER — ONDANSETRON 4 MG/1
4 TABLET, ORALLY DISINTEGRATING ORAL
Status: DISCONTINUED | OUTPATIENT
Start: 2023-03-24 | End: 2023-03-28 | Stop reason: HOSPADM

## 2023-03-24 RX ADMIN — ONDANSETRON 4 MG: 2 INJECTION INTRAMUSCULAR; INTRAVENOUS at 19:14

## 2023-03-24 RX ADMIN — PIPERACILLIN AND TAZOBACTAM 3.38 G: 3; .375 INJECTION, POWDER, FOR SOLUTION INTRAVENOUS at 23:10

## 2023-03-24 RX ADMIN — SODIUM CHLORIDE 1000 ML: 9 INJECTION, SOLUTION INTRAVENOUS at 17:39

## 2023-03-24 RX ADMIN — ROSUVASTATIN CALCIUM 30 MG: 10 TABLET, FILM COATED ORAL at 23:10

## 2023-03-24 RX ADMIN — IOPAMIDOL 100 ML: 755 INJECTION, SOLUTION INTRAVENOUS at 17:06

## 2023-03-24 RX ADMIN — ACETAMINOPHEN 650 MG: 325 TABLET ORAL at 23:10

## 2023-03-24 RX ADMIN — PROCHLORPERAZINE EDISYLATE 5 MG: 5 INJECTION INTRAMUSCULAR; INTRAVENOUS at 20:43

## 2023-03-24 RX ADMIN — SODIUM CHLORIDE 1000 ML: 9 INJECTION, SOLUTION INTRAVENOUS at 17:34

## 2023-03-24 RX ADMIN — HYDROMORPHONE HYDROCHLORIDE 1 MG: 1 INJECTION, SOLUTION INTRAMUSCULAR; INTRAVENOUS; SUBCUTANEOUS at 17:40

## 2023-03-24 RX ADMIN — PIPERACILLIN AND TAZOBACTAM 4.5 G: 4; .5 INJECTION, POWDER, FOR SOLUTION INTRAVENOUS at 17:46

## 2023-03-24 NOTE — ED PROVIDER NOTES
HPI   58-year-old man who presents to the emergency department due to continued left lower quadrant pain. He has been having symptoms for almost 2 weeks now. Initially he was diagnosed with uncomplicated diverticulitis and was started on Augmentin. About a week later he had another visit in the [facility] ER. He had a CT scan that showed some residual stranding but otherwise had a reassuring work-up. Despite finishing his Augmentin he has developed worsening pain and rectal bleeding. He says he has had multiple episodes of bright red blood per rectum and worsening left lower quadrant pain. No fevers. No vomiting. He contacted his GI doctor Dr. More Akhtar and he was instructed to come to the emergency department for admission. Past Medical History:   Diagnosis Date    Adverse effect of anesthesia     \"HEART STOPPED\" DURING ACHILLES TENDON REPAIR - WAS TOLD HE NEEDED TO GO SEE HIS HEART DOCTOR    Arthritis     osteo-- primarily affecting back, hands    Asthma     last exacerbation 1 yr ago. Uses albuterol rarely only. Never hospitalized    Chronic obstructive pulmonary disease (HCC)     Chronic pain     BACK/HIPS    Depression     Eczema     GERD (gastroesophageal reflux disease)     occasional only    Heart attack [facility]) 2006    Dr Michael Cali;  stents x2.  Last stress test 2009--WNL    Hypercholesteremia     IBS (irritable bowel syndrome)     Ill-defined condition     OBESE PER PT    Ill-defined condition     ATOPIC DERMATITIS       Past Surgical History:   Procedure Laterality Date    HX ORTHOPAEDIC  2008    Rt knee reconstruction    HX ORTHOPAEDIC      RT ACHILLES TENDON REPAIR    HX OTHER SURGICAL      rectal surgery    HX TONSILLECTOMY      [facility] UNLISTED PROCEDURE CARDIAC SURGERY  2006    stent placed s/p MI         Family History:   Family history unknown: Yes       Social History     Socioeconomic History    Marital status: LEGALLY      Spouse name: Not on file    Number of children: Not on file    Years of education: Not on file    Highest education level: Not on file   Occupational History    Not on file   Tobacco Use    Smoking status: Every Day     Packs/day: 0.50     Types: Cigarettes    Smokeless tobacco: Never   Substance and Sexual Activity    Alcohol use: No    Drug use: Yes     Types: Marijuana    Sexual activity: Yes     Partners: Female   Other Topics Concern    Not on file   Social History Narrative    Not on file     Social Determinants of Health     Financial Resource Strain: Not on file   Food Insecurity: Not on file   Transportation Needs: Not on file   Physical Activity: Not on file   Stress: Not on file   Social Connections: Not on file   Intimate Partner Violence: Not on file   Housing Stability: Not on file         ALLERGIES: Lisinopril, Aspirin, Codeine, and Hydrocodone    Review of Systems  A complete review of systems was performed and all systems reviewed are negative unless otherwise document in the HPI  Vitals:    03/24/23 1320 03/24/23 1322   BP:  126/81   Pulse: 100    Resp: 18    Temp: 98 °F (36.7 °C)    SpO2: 95%    Weight: 96.4 kg (212 lb 8.4 oz)    Height: 5' 9\" (1.753 m)             Physical Exam  Constitutional:       Comments: Uncomfortable and holding left lower quadrant   HENT:      Mouth/Throat:      Comments: Moist mucous membranes  Eyes:      General: No scleral icterus. Extraocular Movements: Extraocular movements intact. Neck:      Comments: Trachea midline  Cardiovascular:      Rate and Rhythm: Normal rate and regular rhythm. Heart sounds: No murmur heard. Pulmonary:      Effort: Pulmonary effort is normal. No respiratory distress. Breath sounds: Normal breath sounds. No wheezing or rales. Abdominal:      Comments: Soft. No distention. Diffuse tenderness worse in the left lower quadrant with localized guarding. Musculoskeletal:         General: No deformity. Normal range of motion. Cervical back: Normal range of motion.    Skin: General: Skin is warm and dry. Neurological:      Comments: Awake and alert. GCS 15        Medical Decision Making  Amount and/or Complexity of Data Reviewed  Labs: ordered. Radiology: ordered. Risk  Prescription drug management. Decision regarding hospitalization. 77-year-old male presenting with the above chief complaint. Vitals are stable. Diffuse abdominal tenderness worse in the left lower quadrant with some localized guarding. No distention appreciated on exam.  He seems to be presenting with continued symptoms of diverticulitis and seems to have failed outpatient therapy. Repeat CT abdomen and pelvis will be ordered to make sure he does not have any complicating factors such as perforation or abscess. Labs been sent and he will be treated symptomatically. He will need admission. Perfect Serve Consult for Admission  6:23 PM    ED Room Number: ER23/23  Patient Name and age:  Vic Mata 57 y.o.  male  Working Diagnosis:   1. Diverticulitis    2. Rectal bleeding    3. Abdominal pain, LLQ (left lower quadrant)        COVID-19 Suspicion:  no  Sepsis present:  no  Reassessment needed: yes  Code Status:  Full Code  Readmission: no  Isolation Requirements:  no  Recommended Level of Care:  med/surg  Department: [facility] Adult ED - 21       Other: Patient presenting with over 2 weeks of left lower quadrant pain. Diagnosed with diverticulitis and then started on Augmentin. Came back to the [facility] ER about 5 days ago with continued pain. Had some residual stranding in his left lower quadrant. Has developed rectal bleeding and worsening pain. Labs are reassuring. Continues to have similar findings on CT without abscess or perforation. He says he discussed with his GI doctor Dr. Shelby Mesa who told him to come to the ER to get admitted. Zosyn ordered.        Procedures

## 2023-03-24 NOTE — PROGRESS NOTES
Admission Medication Reconciliation:    Information obtained from:  Patient, medication bottles  RxQuery data available¹:  YES    Comments/Recommendations: Updated PTA meds/reviewed patient's allergies. 1)  Reviewed medication bottles that patient brought in. Patient reports he was recently prescribed ketorolac; he was not aware that this was in the same class as ibuprofen which he cannot take due to history of GI bleed. Added NSAIDs to the allergy list at his request. Also takes prasugrel 10mg at home. 2)  Medication changes (since last review): Adjusted  - Omeprazole is now 40mg 1cap po daily    Removed  - Dicyclomine oral solution 10mg/5mL  - Ondansetron 8mg ODT  - Ondansetron HCl 4mg  - Promethazine 25mg  - Varenicline 1mg (Chantix)     ¹RxQuery pharmacy benefit data reflects medications filled and processed through the patient's insurance, however   this data does NOT capture whether the medication was picked up or is currently being taken by the patient. Allergies:  Lisinopril, Aspirin, Codeine, Ibuprofen, Ketorolac, and Nsaids (non-steroidal anti-inflammatory drug)    Significant PMH/Disease States:   Past Medical History:   Diagnosis Date    Adverse effect of anesthesia     \"HEART STOPPED\" DURING ACHILLES TENDON REPAIR - WAS TOLD HE NEEDED TO GO SEE HIS HEART DOCTOR    Arthritis     osteo-- primarily affecting back, hands    Asthma     last exacerbation 1 yr ago. Uses albuterol rarely only. Never hospitalized    Chronic obstructive pulmonary disease (HCC)     Chronic pain     BACK/HIPS    Depression     Eczema     GERD (gastroesophageal reflux disease)     occasional only    Heart attack [facility]) 2006    Dr Titi So;  stents x2.  Last stress test 2009--WNL    Hypercholesteremia     IBS (irritable bowel syndrome)     Ill-defined condition     OBESE PER PT    Ill-defined condition     ATOPIC DERMATITIS     Chief Complaint for this Admission:    Chief Complaint   Patient presents with    Rectal Bleeding     Prior to Admission Medications:   Prior to Admission Medications   Prescriptions Last Dose Informant Taking?   carvedilol (COREG) 6.25 mg tablet   Yes   Sig: Take 6.25 mg by mouth two (2) times daily (with meals). Indications: HYPERTENSION   citalopram (CELEXA) 40 mg tablet   Yes   Sig: Take 40 mg by mouth daily. cyclobenzaprine (FLEXERIL) 10 mg tablet   Yes   Sig: Take 0.5-1 Tabs by mouth as needed for Muscle Spasm(s). dicyclomine (BENTYL) 10 mg capsule   Yes   Sig: Take 10 mg by mouth two (2) times a day. fluticasone propionate (FLONASE) 50 mcg/actuation nasal spray   Yes   Si [place] by Both Nostrils route three (3) times daily as needed for Rhinitis.   ketorolac (TORADOL) 10 mg tablet   Yes   Sig: Take 1 Tablet by mouth every six (6) hours as needed for Pain.   losartan (COZAAR) 50 mg tablet   Yes   Sig: Take 50 mg by mouth daily. omeprazole (PRILOSEC) 40 mg capsule   Yes   Sig: Take 40 mg by mouth daily. prasugreL (Effient) 10 mg tablet   Yes   Sig: Take 1 Tab by mouth daily. rosuvastatin (CRESTOR) 10 mg tablet   Yes   Sig: Take 3 Tabs by mouth nightly. spironolactone (ALDACTONE) 50 mg tablet   Yes   Sig: Take 1 Tab by mouth daily. Facility-Administered Medications: None     Please contact the main inpatient pharmacy with any questions or concerns at [phone] and we will direct you to the clinical pharmacist covering this patient's care while in-house.    Shantanu Rosales, PHARMD

## 2023-03-24 NOTE — ED TRIAGE NOTES
Pt arrives from home complaining of pain in LLQ and heavy rectal bleeding.  GI Dr. Augustin Durham advised him to come to ER

## 2023-03-24 NOTE — ED NOTES
Pain reassessment: Pt reported still having pain 6/10 with no much improvement since pain meds given. MD was at bedside offered more pain meds but pt refused.

## 2023-03-25 LAB
ANION GAP SERPL CALC-SCNC: 2 MMOL/L (ref 5–15)
APPEARANCE UR: CLEAR
BACTERIA URNS QL MICRO: NEGATIVE /HPF
BASOPHILS # BLD: 0 K/UL (ref 0–0.1)
BASOPHILS NFR BLD: 0 % (ref 0–1)
BILIRUB UR QL: NEGATIVE
BUN SERPL-MCNC: 12 MG/DL (ref 6–20)
BUN/CREAT SERPL: 13 (ref 12–20)
CALCIUM SERPL-MCNC: 7.8 MG/DL (ref 8.5–10.1)
CHLORIDE SERPL-SCNC: 109 MMOL/L (ref 97–108)
CO2 SERPL-SCNC: 27 MMOL/L (ref 21–32)
COLOR UR: ABNORMAL
CREAT SERPL-MCNC: 0.92 MG/DL (ref 0.7–1.3)
DIFFERENTIAL METHOD BLD: NORMAL
EOSINOPHIL # BLD: 0.4 K/UL (ref 0–0.4)
EOSINOPHIL NFR BLD: 5 % (ref 0–7)
EPITH CASTS URNS QL MICRO: ABNORMAL /LPF
ERYTHROCYTE [DISTWIDTH] IN BLOOD BY AUTOMATED COUNT: 12.8 % (ref 11.5–14.5)
GLUCOSE SERPL-MCNC: 96 MG/DL (ref 65–100)
GLUCOSE UR STRIP.AUTO-MCNC: NEGATIVE MG/DL
HCT VFR BLD AUTO: 40.4 % (ref 36.6–50.3)
HGB BLD-MCNC: 13.6 G/DL (ref 12.1–17)
HGB UR QL STRIP: ABNORMAL
HYALINE CASTS URNS QL MICRO: ABNORMAL /LPF (ref 0–5)
IMM GRANULOCYTES # BLD AUTO: 0 K/UL (ref 0–0.04)
IMM GRANULOCYTES NFR BLD AUTO: 0 % (ref 0–0.5)
KETONES UR QL STRIP.AUTO: NEGATIVE MG/DL
LEUKOCYTE ESTERASE UR QL STRIP.AUTO: NEGATIVE
LYMPHOCYTES # BLD: 2.8 K/UL (ref 0.8–3.5)
LYMPHOCYTES NFR BLD: 36 % (ref 12–49)
MCH RBC QN AUTO: 30.9 PG (ref 26–34)
MCHC RBC AUTO-ENTMCNC: 33.7 G/DL (ref 30–36.5)
MCV RBC AUTO: 91.8 FL (ref 80–99)
MONOCYTES # BLD: 0.8 K/UL (ref 0–1)
MONOCYTES NFR BLD: 11 % (ref 5–13)
NEUTS SEG # BLD: 3.8 K/UL (ref 1.8–8)
NEUTS SEG NFR BLD: 48 % (ref 32–75)
NITRITE UR QL STRIP.AUTO: NEGATIVE
NRBC # BLD: 0 K/UL (ref 0–0.01)
NRBC BLD-RTO: 0 PER 100 WBC
PH UR STRIP: 5.5 (ref 5–8)
PLATELET # BLD AUTO: 257 K/UL (ref 150–400)
PMV BLD AUTO: 9.9 FL (ref 8.9–12.9)
POTASSIUM SERPL-SCNC: 3.9 MMOL/L (ref 3.5–5.1)
PROT UR STRIP-MCNC: NEGATIVE MG/DL
RBC # BLD AUTO: 4.4 M/UL (ref 4.1–5.7)
RBC #/AREA URNS HPF: ABNORMAL /HPF (ref 0–5)
SODIUM SERPL-SCNC: 138 MMOL/L (ref 136–145)
SP GR UR REFRACTOMETRY: 1.03 (ref 1–1.03)
UROBILINOGEN UR QL STRIP.AUTO: 0.2 EU/DL (ref 0.2–1)
WBC # BLD AUTO: 7.8 K/UL (ref 4.1–11.1)
WBC URNS QL MICRO: ABNORMAL /HPF (ref 0–4)

## 2023-03-25 PROCEDURE — 85025 COMPLETE CBC W/AUTO DIFF WBC: CPT

## 2023-03-25 PROCEDURE — 74011250636 HC RX REV CODE- 250/636: Performed by: FAMILY MEDICINE

## 2023-03-25 PROCEDURE — 36415 COLL VENOUS BLD VENIPUNCTURE: CPT

## 2023-03-25 PROCEDURE — 74011250637 HC RX REV CODE- 250/637

## 2023-03-25 PROCEDURE — 74011000258 HC RX REV CODE- 258: Performed by: FAMILY MEDICINE

## 2023-03-25 PROCEDURE — 65270000032 HC RM SEMIPRIVATE

## 2023-03-25 PROCEDURE — 74011250637 HC RX REV CODE- 250/637: Performed by: NURSE PRACTITIONER

## 2023-03-25 PROCEDURE — 81001 URINALYSIS AUTO W/SCOPE: CPT

## 2023-03-25 PROCEDURE — 74011250637 HC RX REV CODE- 250/637: Performed by: FAMILY MEDICINE

## 2023-03-25 PROCEDURE — 80048 BASIC METABOLIC PNL TOTAL CA: CPT

## 2023-03-25 RX ORDER — LANOLIN ALCOHOL/MO/W.PET/CERES
3 CREAM (GRAM) TOPICAL
Status: DISCONTINUED | OUTPATIENT
Start: 2023-03-25 | End: 2023-03-28 | Stop reason: HOSPADM

## 2023-03-25 RX ORDER — CYCLOBENZAPRINE HCL 10 MG
10 TABLET ORAL ONCE
Status: COMPLETED | OUTPATIENT
Start: 2023-03-26 | End: 2023-03-26

## 2023-03-25 RX ORDER — CALCIUM CARBONATE 200(500)MG
200 TABLET,CHEWABLE ORAL
Status: DISCONTINUED | OUTPATIENT
Start: 2023-03-25 | End: 2023-03-28 | Stop reason: HOSPADM

## 2023-03-25 RX ADMIN — ROSUVASTATIN CALCIUM 30 MG: 10 TABLET, FILM COATED ORAL at 21:04

## 2023-03-25 RX ADMIN — CARVEDILOL 6.25 MG: 6.25 TABLET, FILM COATED ORAL at 07:14

## 2023-03-25 RX ADMIN — PANTOPRAZOLE SODIUM 20 MG: 20 TABLET, DELAYED RELEASE ORAL at 07:14

## 2023-03-25 RX ADMIN — Medication 3 MG: at 21:04

## 2023-03-25 RX ADMIN — PIPERACILLIN AND TAZOBACTAM 3.38 G: 3; .375 INJECTION, POWDER, FOR SOLUTION INTRAVENOUS at 23:37

## 2023-03-25 RX ADMIN — CALCIUM CARBONATE (ANTACID) CHEW TAB 500 MG 200 MG: 500 CHEW TAB at 13:22

## 2023-03-25 RX ADMIN — PIPERACILLIN AND TAZOBACTAM 3.38 G: 3; .375 INJECTION, POWDER, FOR SOLUTION INTRAVENOUS at 17:49

## 2023-03-25 RX ADMIN — CARVEDILOL 6.25 MG: 6.25 TABLET, FILM COATED ORAL at 17:49

## 2023-03-25 RX ADMIN — PIPERACILLIN AND TAZOBACTAM 3.38 G: 3; .375 INJECTION, POWDER, FOR SOLUTION INTRAVENOUS at 07:14

## 2023-03-25 RX ADMIN — CITALOPRAM HYDROBROMIDE 40 MG: 20 TABLET ORAL at 08:33

## 2023-03-25 RX ADMIN — LOSARTAN POTASSIUM 50 MG: 50 TABLET, FILM COATED ORAL at 08:33

## 2023-03-25 RX ADMIN — SPIRONOLACTONE 50 MG: 25 TABLET ORAL at 08:33

## 2023-03-25 NOTE — CONSULTS
[address], [address] [place] [address]       GASTROENTEROLOGY CONSULTATION NOTE        NAME:  Corazon Mayen   :   1960   MRN:   [id number]           Consult Date: 3/25/2023 2:15 PM      History of Present Illness:  Patient is a 57 y.o. who is seen in consultation at the request of Vianca Rosales NP for abdominal pain and rectal bleeding. He has been seen by Dr. Izzy Mendez of our group. Recent bouts of LLQ abdominal pain and CT concern for possible omental infarct, epiploic appendagitis, possible diverticulitis. Symptoms have persisted/recurred despite antibiotics. Background of longstanding IBS symptoms. Last colonoscopy was in 2016 but no records for my review. History of EGD with Dr. Estela Belle in 2019 with Castellanos's seen on biopsies. He reports rectal bleeding prior to coming to the hospital, but none since. CT shows unchanged soft tissue stranding anterior to mid left colon and may represent omental infarct. Hgb 13. 6. Hospitalist team has started Zosyn. On PPI. PMH:  Past Medical History:   Diagnosis Date    Adverse effect of anesthesia     \"HEART STOPPED\" DURING ACHILLES TENDON REPAIR - WAS TOLD HE NEEDED TO GO SEE HIS HEART DOCTOR    Arthritis     osteo-- primarily affecting back, hands    Asthma     last exacerbation 1 yr ago. Uses albuterol rarely only. Never hospitalized    Chronic obstructive pulmonary disease (HCC)     Chronic pain     BACK/HIPS    Depression     Eczema     GERD (gastroesophageal reflux disease)     occasional only    Heart attack [facility]) 2006    Dr Adin Oleary;  stents x2.  Last stress test --WNL    Hypercholesteremia     IBS (irritable bowel syndrome)     Ill-defined condition     OBESE PER PT    Ill-defined condition     ATOPIC DERMATITIS       PSH:  Past Surgical History:   Procedure Laterality Date    HX ORTHOPAEDIC      Rt knee reconstruction    HX ORTHOPAEDIC      RT ACHILLES TENDON REPAIR    HX OTHER SURGICAL rectal surgery    HX TONSILLECTOMY      [facility] UNLISTED PROCEDURE CARDIAC SURGERY  2006    stent placed s/p MI       Allergies: Allergies   Allergen Reactions    Lisinopril Other (comments)     Elevated K+    Aspirin Other (comments)     GI Bleeding    Codeine Nausea and Vomiting    Ibuprofen Other (comments)     GI bleed    Ketorolac Other (comments)     GI bleed    Nsaids (Non-Steroidal Anti-Inflammatory Drug) Other (comments)     GI bleed       Home Medications:  Prior to Admission Medications   Prescriptions Last Dose Informant Patient Reported? Taking?   carvedilol (COREG) 6.25 mg tablet   Yes Yes   Sig: Take 6.25 mg by mouth two (2) times daily (with meals). Indications: HYPERTENSION   citalopram (CELEXA) 40 mg tablet   Yes Yes   Sig: Take 40 mg by mouth daily. cyclobenzaprine (FLEXERIL) 10 mg tablet   No Yes   Sig: Take 0.5-1 Tabs by mouth as needed for Muscle Spasm(s). dicyclomine (BENTYL) 10 mg capsule   Yes Yes   Sig: Take 10 mg by mouth two (2) times a day. fluticasone propionate (FLONASE) 50 mcg/actuation nasal spray   Yes Yes   Si [place] by Both Nostrils route three (3) times daily as needed for Rhinitis.   ketorolac (TORADOL) 10 mg tablet   No Yes   Sig: Take 1 Tablet by mouth every six (6) hours as needed for Pain.   losartan (COZAAR) 50 mg tablet   Yes Yes   Sig: Take 50 mg by mouth daily. omeprazole (PRILOSEC) 40 mg capsule   Yes Yes   Sig: Take 40 mg by mouth daily. prasugreL (Effient) 10 mg tablet   No Yes   Sig: Take 1 Tab by mouth daily. rosuvastatin (CRESTOR) 10 mg tablet   No Yes   Sig: Take 3 Tabs by mouth nightly. spironolactone (ALDACTONE) 50 mg tablet   No Yes   Sig: Take 1 Tab by mouth daily.       Facility-Administered Medications: None       Hospital Medications:  Current Facility-Administered Medications   Medication Dose Route Frequency    calcium carbonate (TUMS) chewable tablet 200 mg [elemental]  200 mg Oral Q4H PRN    carvediloL (COREG) tablet 6.25 mg  6.25 mg Oral BID WITH MEALS    citalopram (CELEXA) tablet 40 mg  40 mg Oral DAILY    fluticasone propionate (FLONASE) 50 mcg/actuation nasal spray 1 Spray  1 Spray Both Nostrils BID PRN    losartan (COZAAR) tablet 50 mg  50 mg Oral DAILY    pantoprazole (PROTONIX) tablet 20 mg  20 mg Oral ACB    rosuvastatin (CRESTOR) tablet 30 mg  30 mg Oral QHS    spironolactone (ALDACTONE) tablet 50 mg  50 mg Oral DAILY    piperacillin-tazobactam (ZOSYN) 3.375 g in 0.9% sodium chloride (MBP/ADV) 100 mL MBP  3.375 g IntraVENous Q8H    sodium chloride (NS) flush 5-40 mL  5-40 mL IntraVENous Q8H    sodium chloride (NS) flush 5-40 mL  5-40 mL IntraVENous PRN    acetaminophen (TYLENOL) tablet 650 mg  650 mg Oral Q6H PRN    Or    acetaminophen (TYLENOL) suppository 650 mg  650 mg Rectal Q6H PRN    polyethylene glycol (MIRALAX) packet 17 g  17 g Oral DAILY PRN    ondansetron (ZOFRAN ODT) tablet 4 mg  4 mg Oral Q8H PRN    Or    ondansetron (ZOFRAN) injection 4 mg  4 mg IntraVENous Q6H PRN       Social History:  Social History     Tobacco Use    Smoking status: Every Day     Packs/day: 0.50     Types: Cigarettes    Smokeless tobacco: Never   Substance Use Topics    Alcohol use: No       Family History:  Family History   Family history unknown: Yes       Review of Systems:  Constitutional: negative fever, negative chills, negative weight loss  Eyes:   negative visual changes  ENT:   negative sore throat, tongue or lip swelling  Respiratory:  negative cough, negative dyspnea  Cards:  negative for chest pain, palpitations, lower extremity edema  GI:   See HPI  :  negative for frequency, dysuria  Integument:  negative for rash and pruritus  Heme:  negative for easy bruising and gum/nose bleeding  Musculoskel: negative for myalgias,  back pain and muscle weakness  Neuro: negative for headaches, dizziness, vertigo  Psych:  negative for feelings of anxiety, depression     Objective:   Patient Vitals for the past 8 hrs:   BP Temp Pulse Resp SpO2   03/25/23 0803 126/84 98.4 °F (36.9 °C) 69 17 95 %   03/25/23 0634 (!) 102/58 -- 89 -- --     No intake/output data recorded. 03/23 1901 - 03/25 0700  In: 2000 [I.V.:2000]  Out: -     PHYSICAL EXAM:  General: WD, WN. Alert, cooperative, no acute distress    HEENT: NC, Atraumatic. PERRLA, EOMI. Anicteric sclerae. Lungs:  CTA Bilaterally. No Wheezing/Rhonchi/Rales. Heart:  Regular  rhythm,  No murmur (), No Rubs, No Gallops  Abdomen: Soft, Non distended, Non tender. +Bowel sounds, no HSM  Extremities: No c/c/e  Neurologic:  CN 2-12 gi, Alert and oriented X 3. No acute neurological distress   Psych:   Good insight. Not anxious nor agitated. Data Review     Recent Labs     03/25/23  0348 03/24/23  1341   WBC 7.8 9.1   HGB 13.6 15.2   HCT 40.4 46.9    310     Recent Labs     03/25/23  0348 03/24/23  1341    136   K 3.9 4.1   * 107   CO2 27 28   BUN 12 13   CREA 0.92 1.06   GLU 96 106*   CA 7.8* 8.7     Recent Labs     03/24/23  1341   AP 85   TP 6.7   ALB 3.5   GLOB 3.2     Recent Labs     03/24/23  1704   INR 1.0   PTP 10.5       Radiology Review    As above       Assessment:     LLQ abdominal pain and recent treatment for diverticulitis  CT imaging with inflammatory stranding with differential diagnosis including epiploic appendagitis, possible omental infarct, possibly diverticulitis (based on review of recent CT's)  Rectal bleeding  Castellanos's esophagus     Plan:     Continue antibiotics  Would plan for colonoscopy on Monday  Clear liquid diet Sunday  PEG 3350 4L preparation to begin Sunday at 16:00  NPO after midnight Sunday night  Dr. Eloisa Roper to follow on Monday     Signed By: Mich Ba.  Teresita Walton MD     3/25/2023  2:15 PM

## 2023-03-25 NOTE — PROGRESS NOTES
Hospitalist Progress Note  Milla Avila NP  Answering service: [phone] from in house phone        Date of Service:  3/25/2023  NAME:  Donavan Acuña  :  1960  MRN:  [id number]      Admission Summary:   Donavan Acuña is a 57 y.o. male who presents with abdominal pain. Patient first presented to the emergency room on 3/15/2023 for evaluation of abdominal pain and was diagnosed with diverticulitis. Patient was discharged to home with a course of Augmentin. Patient has completed the antibiotics however the pain got worse and represented to the emergency room again on 3/22/2023. Repeat CT scan was done at that time and appears pretty stable and again patient was discharged to home. Today patient presented to the ER again with worsening pain, also blood in the stool, he has contacted his gastroenterologist Dr. Sveta Allison prior to coming to the ER. Labs in the ER pretty much unremarkable. CT abdomen and pelvis done today shows soft tissue stranding anterior to the mid left colon is unchanged and may represent an omental infarct. There has been no significant change. Patient was admitted for further evaluation management. The patient denies any headache, blurry vision, sore throat, trouble swallowing, trouble with speech, chest pain, SOB, cough, fever, chills, N/V/D, abd pain, urinary symptoms, constipation, recent travels, sick contacts, focal or generalized neurological symptoms, falls, injuries, rashes, contact with COVID-19 diagnosed patients, hematemesis, melena, hemoptysis, hematuria, rashes, denies starting any new medications and denies any other concerns or problems besides as mentioned above. Interval history / Subjective:      Evaluated by GI, plan for colonoscopy on Monday, start prepping tomorrow. Clear liquids tomorrow. Pt is aware.   Patient eating food that wife brought for him, no further hematochezia. No abd pain however was tender to right upper quadrant on exam    Assessment & Plan:     Diverticulitis  -Patient with prolonged symptoms, symptoms not resolved after a course of oral antibiotics  - cw zosyn  - gi following, colonoscopy Monday     Hematochezia  -Likely related to diverticulitis, H&H is stable, monitor  -Hold Effient patient takes at home     IBS  -Patient with history of IBS, reports some cramping pain  -Likely his IBS playing a role in his symptoms     Hypertension  -Continue Coreg, losartan and Aldactone  -Monitor blood pressure     Dyslipidemia  -Continue statin     Depression/anxiety  -Continue Celexa          Code status: Full  Prophylaxis: [address] discussed with: patient, nurse, Dr. Sara Kim  Anticipated Disposition: Home Monday after colonoscopy? Hospital Problems  Date Reviewed: 7/9/2020            Codes Class Noted POA    Diverticulitis ICD-10-CM: K57.92  ICD-9-CM: 562.11  3/24/2023 Unknown               Review of Systems:   A comprehensive review of systems was negative except for that written in the HPI. Vital Signs:    Last 24hrs VS reviewed since prior progress note.  Most recent are:  Visit Vitals  /84   Pulse 69   Temp 98.4 °F (36.9 °C)   Resp 17   Ht 5' 9\" (1.753 m)   Wt 96.4 kg (212 lb 8.4 oz)   SpO2 95%   BMI 31.38 kg/m²         Intake/Output Summary (Last 24 hours) at 3/25/2023 1428  Last data filed at 3/24/2023 1853  Gross per 24 hour   Intake 2000 ml   Output --   Net 2000 ml        Physical Examination:     I had a face to face encounter with this patient and independently examined them on 3/25/2023 as outlined below:          General : alert x 3, awake, no acute distress,   HEENT: PEERL, EOMI, moist mucus membrane  Neck: supple, no JVD  Chest: Clear to auscultation bilaterally   CVS: S1 S2 heard, Capillary refill less than 2 seconds  Abd: soft/ non tender, non distended, BS physiological  Ext: no clubbing, no cyanosis, no edema, brisk 2+ DP pulses  Neuro/Psych: pleasant mood and affect, CN 2-12 grossly intact, sensory grossly within normal limit, Strength 5/5 in all extremities  Skin: warm            Data Review:    Review and/or order of clinical lab test  Review and/or order of tests in the radiology section of CPT  Review and/or order of tests in the medicine section of CPT    I have independently reviewed and interpreted patient's lab and all other diagnostic data    Notes reviewed from all clinical/nonclinical/nursing services involved in patient's clinical care. Care coordination discussions were held with appropriate clinical/nonclinical/ nursing providers based on care coordination needs. Labs:     Recent Labs     03/25/23  0348 03/24/23  1341   WBC 7.8 9.1   HGB 13.6 15.2   HCT 40.4 46.9    310     Recent Labs     03/25/23  0348 03/24/23  1341    136   K 3.9 4.1   * 107   CO2 27 28   BUN 12 13   CREA 0.92 1.06   GLU 96 106*   CA 7.8* 8.7     Recent Labs     03/24/23  1341   ALT 25   AP 85   TBILI 0.6   TP 6.7   ALB 3.5   GLOB 3.2     Recent Labs     03/24/23  1704   INR 1.0   PTP 10.5      No results for input(s): FE, TIBC, PSAT, FERR in the last 72 hours. No results found for: FOL, RBCF   No results for input(s): PH, PCO2, PO2 in the last 72 hours. No results for input(s): CPK, CKNDX, TROIQ in the last 72 hours.     No lab exists for component: CPKMB  Lab Results   Component Value Date/Time    Cholesterol, total 137 11/14/2018 11:01 AM    HDL Cholesterol 51 11/14/2018 11:01 AM    LDL, calculated 64 11/14/2018 11:01 AM    Triglyceride 111 11/14/2018 11:01 AM     No results found for: [facility]  Lab Results   Component Value Date/Time    Color YELLOW/STRAW 03/25/2023 12:15 PM    Appearance CLEAR 03/25/2023 12:15 PM    Specific gravity 1.026 03/25/2023 12:15 PM    Specific gravity 1.025 03/22/2023 05:09 AM    pH (UA) 5.5 03/25/2023 12:15 PM    Protein Negative 03/25/2023 12:15 PM    Glucose Negative 03/25/2023 12:15 PM    Ketone Negative 03/25/2023 12:15 PM    Bilirubin Negative 03/25/2023 12:15 PM    Urobilinogen 0.2 03/25/2023 12:15 PM    Nitrites Negative 03/25/2023 12:15 PM    Leukocyte Esterase Negative 03/25/2023 12:15 PM    Epithelial cells FEW 03/25/2023 12:15 PM    Bacteria Negative 03/25/2023 12:15 PM    WBC 0-4 03/25/2023 12:15 PM    RBC 10-20 03/25/2023 12:15 PM         Medications Reviewed:     Current Facility-Administered Medications   Medication Dose Route Frequency    calcium carbonate (TUMS) chewable tablet 200 mg [elemental]  200 mg Oral Q4H PRN    carvediloL (COREG) tablet 6.25 mg  6.25 mg Oral BID WITH MEALS    citalopram (CELEXA) tablet 40 mg  40 mg Oral DAILY    fluticasone propionate (FLONASE) 50 mcg/actuation nasal spray 1 Spray  1 Spray Both Nostrils BID PRN    losartan (COZAAR) tablet 50 mg  50 mg Oral DAILY    pantoprazole (PROTONIX) tablet 20 mg  20 mg Oral ACB    rosuvastatin (CRESTOR) tablet 30 mg  30 mg Oral QHS    spironolactone (ALDACTONE) tablet 50 mg  50 mg Oral DAILY    piperacillin-tazobactam (ZOSYN) 3.375 g in 0.9% sodium chloride (MBP/ADV) 100 mL MBP  3.375 g IntraVENous Q8H    sodium chloride (NS) flush 5-40 mL  5-40 mL IntraVENous Q8H    sodium chloride (NS) flush 5-40 mL  5-40 mL IntraVENous PRN    acetaminophen (TYLENOL) tablet 650 mg  650 mg Oral Q6H PRN    Or    acetaminophen (TYLENOL) suppository 650 mg  650 mg Rectal Q6H PRN    polyethylene glycol (MIRALAX) packet 17 g  17 g Oral DAILY PRN    ondansetron (ZOFRAN ODT) tablet 4 mg  4 mg Oral Q8H PRN    Or    ondansetron (ZOFRAN) injection 4 mg  4 mg IntraVENous Q6H PRN     ______________________________________________________________________  EXPECTED LENGTH OF STAY: - - -  ACTUAL LENGTH OF STAY:          1                 Etta Rosales V NP

## 2023-03-25 NOTE — ED NOTES
Zofran Reassessment: Pt reported feeling better, pain 3/10. But still having episode of dry heaving in waves.

## 2023-03-25 NOTE — ED NOTES
Hospitalist just called back, nurse informed that pt continues to dry heavy, feeling clammy, and having waves of nausea and pain.

## 2023-03-25 NOTE — ED NOTES
Has a final transfer review been performed? Yes    Reason for Admission: Diverticulitis  Patient comes from: Home  Mental Status: Alert and oriented  ADL:self care or partial assistance  Ambulation:mild difficulty  Pertinent Info/Safety Concerns: Pt frequent dry heaving and nauseated. Hospitalist paged and pt was jus medicated. Also note Hx of COPD,O2 saturation has been 92-93% and when he sleeps drops to 88%.    COVID Status: Not Tested  MEWS Score: 0  Sinus Rhythm  Vitals:    03/24/23 1322 03/24/23 1753 03/24/23 1857 03/24/23 1953   BP: 126/81  (!) 136/90 120/80   Pulse:   70 65   Resp:   16 10   Temp:   98.2 °F (36.8 °C) 97.8 °F (36.6 °C)   SpO2:  96% 93% 93%   Weight:       Height:         Lines:   Peripheral IV 03/24/23 Left Forearm (Active)   Site Assessment Clean, dry, & intact 03/24/23 1340   Phlebitis Assessment 0 03/24/23 1340   Infiltration Assessment 0 03/24/23 1340   Dressing Status Clean, dry, & intact 03/24/23 1340      Transport mode:Wheelchair    Wisam Chamberlain  being transferred to (unit) for routine progression of care     \"Notification of etransfer note given to (Name) Adriel Castro (Title) RN

## 2023-03-26 LAB
COMMENT, HOLDF: NORMAL
HCT VFR BLD AUTO: 44.2 % (ref 36.6–50.3)
HGB BLD-MCNC: 14.7 G/DL (ref 12.1–17)
SAMPLES BEING HELD,HOLD: NORMAL

## 2023-03-26 PROCEDURE — 74011000250 HC RX REV CODE- 250: Performed by: INTERNAL MEDICINE

## 2023-03-26 PROCEDURE — 65270000032 HC RM SEMIPRIVATE

## 2023-03-26 PROCEDURE — 74011000250 HC RX REV CODE- 250: Performed by: FAMILY MEDICINE

## 2023-03-26 PROCEDURE — 85018 HEMOGLOBIN: CPT

## 2023-03-26 PROCEDURE — 74011250636 HC RX REV CODE- 250/636: Performed by: FAMILY MEDICINE

## 2023-03-26 PROCEDURE — 36415 COLL VENOUS BLD VENIPUNCTURE: CPT

## 2023-03-26 PROCEDURE — 74011250637 HC RX REV CODE- 250/637: Performed by: FAMILY MEDICINE

## 2023-03-26 PROCEDURE — 74011000258 HC RX REV CODE- 258: Performed by: FAMILY MEDICINE

## 2023-03-26 PROCEDURE — 74011250637 HC RX REV CODE- 250/637

## 2023-03-26 RX ADMIN — SODIUM CHLORIDE, PRESERVATIVE FREE 10 ML: 5 INJECTION INTRAVENOUS at 21:12

## 2023-03-26 RX ADMIN — POLYETHYLENE GLYCOL-3350 AND ELECTROLYTES 4000 ML: 236; 6.74; 5.86; 2.97; 22.74 POWDER, FOR SOLUTION ORAL at 16:21

## 2023-03-26 RX ADMIN — ROSUVASTATIN CALCIUM 30 MG: 10 TABLET, FILM COATED ORAL at 21:11

## 2023-03-26 RX ADMIN — PIPERACILLIN AND TAZOBACTAM 3.38 G: 3; .375 INJECTION, POWDER, FOR SOLUTION INTRAVENOUS at 16:20

## 2023-03-26 RX ADMIN — PIPERACILLIN AND TAZOBACTAM 3.38 G: 3; .375 INJECTION, POWDER, FOR SOLUTION INTRAVENOUS at 07:28

## 2023-03-26 RX ADMIN — PANTOPRAZOLE SODIUM 20 MG: 20 TABLET, DELAYED RELEASE ORAL at 07:28

## 2023-03-26 RX ADMIN — LOSARTAN POTASSIUM 50 MG: 50 TABLET, FILM COATED ORAL at 09:03

## 2023-03-26 RX ADMIN — CITALOPRAM HYDROBROMIDE 40 MG: 20 TABLET ORAL at 09:03

## 2023-03-26 RX ADMIN — CYCLOBENZAPRINE 10 MG: 10 TABLET, FILM COATED ORAL at 00:18

## 2023-03-26 RX ADMIN — CARVEDILOL 6.25 MG: 6.25 TABLET, FILM COATED ORAL at 07:28

## 2023-03-26 RX ADMIN — SPIRONOLACTONE 50 MG: 25 TABLET ORAL at 09:02

## 2023-03-26 NOTE — PROGRESS NOTES
0000-Pt complaining of neck pain and requesting home medications of 10 mg of Flexeril. NP Britney Esparza served and orders placed for one time order. 0730-Bedside shift change report given to 1 Reji Mcneill (oncoming nurse) by Ethan Bradford (offgoing nurse). Report included the following information SBAR, Kardex, ED Summary, MAR, and Recent Results.

## 2023-03-26 NOTE — PROGRESS NOTES
GI    Continue antibiotics. Colonoscopy preparation to begin this afternoon. Please keep NPO after midnight. Please hold any anticoagulation. Dr. Venita Joseph to assume care from GI on Monday. Jamey Jimenez MD

## 2023-03-26 NOTE — PROGRESS NOTES
Hospitalist Progress Note  Lily Giraldo NP  Answering service: [phone] from in house phone        Date of Service:  3/26/2023  NAME:  Shaheed Ch  :  1960  MRN:  [id number]      Admission Summary:   Shaheed Ch is a 57 y.o. male who presents with abdominal pain. Patient first presented to the emergency room on 3/15/2023 for evaluation of abdominal pain and was diagnosed with diverticulitis. Patient was discharged to home with a course of Augmentin. Patient has completed the antibiotics however the pain got worse and represented to the emergency room again on 3/22/2023. Repeat CT scan was done at that time and appears pretty stable and again patient was discharged to home. Today patient presented to the ER again with worsening pain, also blood in the stool, he has contacted his gastroenterologist Dr. Shahida Davenport prior to coming to the ER. Labs in the ER pretty much unremarkable. CT abdomen and pelvis done today shows soft tissue stranding anterior to the mid left colon is unchanged and may represent an omental infarct. There has been no significant change. Patient was admitted for further evaluation management. The patient denies any headache, blurry vision, sore throat, trouble swallowing, trouble with speech, chest pain, SOB, cough, fever, chills, N/V/D, abd pain, urinary symptoms, constipation, recent travels, sick contacts, focal or generalized neurological symptoms, falls, injuries, rashes, contact with COVID-19 diagnosed patients, hematemesis, melena, hemoptysis, hematuria, rashes, denies starting any new medications and denies any other concerns or problems besides as mentioned above.         Interval history / Subjective:   NPO after MN for colonoscopy [facility] w/ Dr. Shahida Davenport  No new complatints     Assessment & Plan:     Diverticulitis  -Patient with prolonged symptoms, symptoms not resolved after a course of oral antibiotics  - cw zosyn  - gi following, colonoscopy Monday     Hematochezia  -Likely related to diverticulitis, H&H is stable, monitor  -Hold Effient patient takes at home     IBS  -Patient with history of IBS, reports some cramping pain  -Likely his IBS playing a role in his symptoms     Hypertension  -Continue Coreg, losartan and Aldactone  -Monitor blood pressure     Dyslipidemia  -Continue statin     Depression/anxiety  -Continue Celexa          Code status: Full  Prophylaxis: [address] discussed with: patient, nurse, Dr. Maddox Level  Anticipated Disposition: Home Monday after colonoscopy? Hospital Problems  Date Reviewed: 7/9/2020            Codes Class Noted POA    Diverticulitis ICD-10-CM: K57.92  ICD-9-CM: 562.11  3/24/2023 Unknown             Review of Systems:   A comprehensive review of systems was negative except for that written in the HPI. Vital Signs:    Last 24hrs VS reviewed since prior progress note.  Most recent are:  Visit Vitals  /65   Pulse 70   Temp 98 °F (36.7 °C)   Resp 18   Ht 5' 9\" (1.753 m)   Wt 96.4 kg (212 lb 8.4 oz)   SpO2 92%   BMI 31.38 kg/m²       No intake or output data in the 24 hours ending 03/26/23 1108       Physical Examination:     I had a face to face encounter with this patient and independently examined them on 3/26/2023 as outlined below:          General : alert x 3, awake, no acute distress,   HEENT: PEERL, EOMI, moist mucus membrane  Neck: supple, no JVD  Chest: Clear to auscultation bilaterally   CVS: S1 S2 heard, Capillary refill less than 2 seconds  Abd: soft/ non tender, non distended, BS physiological  Ext: no clubbing, no cyanosis, no edema, brisk 2+ DP pulses  Neuro/Psych: pleasant mood and affect, CN 2-12 grossly intact, sensory grossly within normal limit, Strength 5/5 in all extremities  Skin: warm            Data Review:    Review and/or order of clinical lab test  Review and/or order of tests in the radiology section of CPT  Review and/or order of tests in the medicine section of CPT    I have independently reviewed and interpreted patient's lab and all other diagnostic data    Notes reviewed from all clinical/nonclinical/nursing services involved in patient's clinical care. Care coordination discussions were held with appropriate clinical/nonclinical/ nursing providers based on care coordination needs. Labs:     Recent Labs     03/26/23  0309 03/25/23  0348 03/24/23  1341   WBC  --  7.8 9.1   HGB 14.7 13.6 15.2   HCT 44.2 40.4 46.9   PLT  --  257 310       Recent Labs     03/25/23  0348 03/24/23  1341    136   K 3.9 4.1   * 107   CO2 27 28   BUN 12 13   CREA 0.92 1.06   GLU 96 106*   CA 7.8* 8.7       Recent Labs     03/24/23  1341   ALT 25   AP 85   TBILI 0.6   TP 6.7   ALB 3.5   GLOB 3.2       Recent Labs     03/24/23  1704   INR 1.0   PTP 10.5        No results for input(s): FE, TIBC, PSAT, FERR in the last 72 hours. No results found for: FOL, RBCF   No results for input(s): PH, PCO2, PO2 in the last 72 hours. No results for input(s): CPK, CKNDX, TROIQ in the last 72 hours.     No lab exists for component: CPKMB  Lab Results   Component Value Date/Time    Cholesterol, total 137 11/14/2018 11:01 AM    HDL Cholesterol 51 11/14/2018 11:01 AM    LDL, calculated 64 11/14/2018 11:01 AM    Triglyceride 111 11/14/2018 11:01 AM     No results found for: [facility]  Lab Results   Component Value Date/Time    Color YELLOW/STRAW 03/25/2023 12:15 PM    Appearance CLEAR 03/25/2023 12:15 PM    Specific gravity 1.026 03/25/2023 12:15 PM    Specific gravity 1.025 03/22/2023 05:09 AM    pH (UA) 5.5 03/25/2023 12:15 PM    Protein Negative 03/25/2023 12:15 PM    Glucose Negative 03/25/2023 12:15 PM    Ketone Negative 03/25/2023 12:15 PM    Bilirubin Negative 03/25/2023 12:15 PM    Urobilinogen 0.2 03/25/2023 12:15 PM    Nitrites Negative 03/25/2023 12:15 PM    Leukocyte Esterase Negative 03/25/2023 12:15 PM Epithelial cells FEW 03/25/2023 12:15 PM    Bacteria Negative 03/25/2023 12:15 PM    WBC 0-4 03/25/2023 12:15 PM    RBC 10-20 03/25/2023 12:15 PM         Medications Reviewed:     Current Facility-Administered Medications   Medication Dose Route Frequency    calcium carbonate (TUMS) chewable tablet 200 mg [elemental]  200 mg Oral Q4H PRN    peg 3350-electrolytes (COLYTE) 4000 mL  4,000 mL Oral ONCE    melatonin tablet 3 mg  3 mg Oral QHS PRN    carvediloL (COREG) tablet 6.25 mg  6.25 mg Oral BID WITH MEALS    citalopram (CELEXA) tablet 40 mg  40 mg Oral DAILY    fluticasone propionate (FLONASE) 50 mcg/actuation nasal spray 1 Spray  1 Spray Both Nostrils BID PRN    losartan (COZAAR) tablet 50 mg  50 mg Oral DAILY    pantoprazole (PROTONIX) tablet 20 mg  20 mg Oral ACB    rosuvastatin (CRESTOR) tablet 30 mg  30 mg Oral QHS    spironolactone (ALDACTONE) tablet 50 mg  50 mg Oral DAILY    piperacillin-tazobactam (ZOSYN) 3.375 g in 0.9% sodium chloride (MBP/ADV) 100 mL MBP  3.375 g IntraVENous Q8H    sodium chloride (NS) flush 5-40 mL  5-40 mL IntraVENous Q8H    sodium chloride (NS) flush 5-40 mL  5-40 mL IntraVENous PRN    acetaminophen (TYLENOL) tablet 650 mg  650 mg Oral Q6H PRN    Or    acetaminophen (TYLENOL) suppository 650 mg  650 mg Rectal Q6H PRN    polyethylene glycol (MIRALAX) packet 17 g  17 g Oral DAILY PRN    ondansetron (ZOFRAN ODT) tablet 4 mg  4 mg Oral Q8H PRN    Or    ondansetron (ZOFRAN) injection 4 mg  4 mg IntraVENous Q6H PRN     ______________________________________________________________________  EXPECTED LENGTH OF STAY: - - -  ACTUAL LENGTH OF STAY:          2                 Etta Rosales V NP

## 2023-03-26 NOTE — PROGRESS NOTES
Bedside and Verbal shift change report given to Fatimah Barrett (oncoming nurse) by Nohemi Campos (offgoing nurse). Report included the following information SBAR, Kardex, OR Summary, Procedure Summary, MAR, and Recent Results.

## 2023-03-27 ENCOUNTER — ANESTHESIA EVENT (OUTPATIENT)
Dept: ENDOSCOPY | Age: 63
DRG: 254 | End: 2023-03-27
Payer: MEDICAID

## 2023-03-27 ENCOUNTER — ANESTHESIA (OUTPATIENT)
Dept: ENDOSCOPY | Age: 63
DRG: 254 | End: 2023-03-27
Payer: MEDICAID

## 2023-03-27 LAB
GLUCOSE BLD STRIP.AUTO-MCNC: 81 MG/DL (ref 65–117)
HCT VFR BLD AUTO: 46.1 % (ref 36.6–50.3)
HGB BLD-MCNC: 15.5 G/DL (ref 12.1–17)
SERVICE CMNT-IMP: NORMAL

## 2023-03-27 PROCEDURE — 74011250637 HC RX REV CODE- 250/637

## 2023-03-27 PROCEDURE — 74011250636 HC RX REV CODE- 250/636: Performed by: INTERNAL MEDICINE

## 2023-03-27 PROCEDURE — 85018 HEMOGLOBIN: CPT

## 2023-03-27 PROCEDURE — 36415 COLL VENOUS BLD VENIPUNCTURE: CPT

## 2023-03-27 PROCEDURE — 77030021593 HC FCPS BIOP ENDOSC BSC -A: Performed by: INTERNAL MEDICINE

## 2023-03-27 PROCEDURE — 74011250637 HC RX REV CODE- 250/637: Performed by: INTERNAL MEDICINE

## 2023-03-27 PROCEDURE — 74011000258 HC RX REV CODE- 258: Performed by: FAMILY MEDICINE

## 2023-03-27 PROCEDURE — 76060000032 HC ANESTHESIA 0.5 TO 1 HR: Performed by: INTERNAL MEDICINE

## 2023-03-27 PROCEDURE — 88305 TISSUE EXAM BY PATHOLOGIST: CPT

## 2023-03-27 PROCEDURE — 0DBK8ZX EXCISION OF ASCENDING COLON, VIA NATURAL OR ARTIFICIAL OPENING ENDOSCOPIC, DIAGNOSTIC: ICD-10-PCS | Performed by: INTERNAL MEDICINE

## 2023-03-27 PROCEDURE — 76040000007: Performed by: INTERNAL MEDICINE

## 2023-03-27 PROCEDURE — 74011000250 HC RX REV CODE- 250: Performed by: INTERNAL MEDICINE

## 2023-03-27 PROCEDURE — 74011000250 HC RX REV CODE- 250: Performed by: FAMILY MEDICINE

## 2023-03-27 PROCEDURE — 82962 GLUCOSE BLOOD TEST: CPT

## 2023-03-27 PROCEDURE — 74011250636 HC RX REV CODE- 250/636: Performed by: NURSE ANESTHETIST, CERTIFIED REGISTERED

## 2023-03-27 PROCEDURE — 74011250636 HC RX REV CODE- 250/636: Performed by: FAMILY MEDICINE

## 2023-03-27 PROCEDURE — 2709999900 HC NON-CHARGEABLE SUPPLY: Performed by: INTERNAL MEDICINE

## 2023-03-27 PROCEDURE — 65270000032 HC RM SEMIPRIVATE

## 2023-03-27 PROCEDURE — 74011250637 HC RX REV CODE- 250/637: Performed by: FAMILY MEDICINE

## 2023-03-27 RX ORDER — FENTANYL CITRATE 50 UG/ML
12.5-2 INJECTION, SOLUTION INTRAMUSCULAR; INTRAVENOUS
Status: DISCONTINUED | OUTPATIENT
Start: 2023-03-27 | End: 2023-03-27 | Stop reason: HOSPADM

## 2023-03-27 RX ORDER — SODIUM CHLORIDE 0.9 % (FLUSH) 0.9 %
5-40 SYRINGE (ML) INJECTION EVERY 8 HOURS
Status: DISCONTINUED | OUTPATIENT
Start: 2023-03-27 | End: 2023-03-28 | Stop reason: HOSPADM

## 2023-03-27 RX ORDER — PROPOFOL 10 MG/ML
INJECTION, EMULSION INTRAVENOUS AS NEEDED
Status: DISCONTINUED | OUTPATIENT
Start: 2023-03-27 | End: 2023-03-27 | Stop reason: HOSPADM

## 2023-03-27 RX ORDER — DEXTROMETHORPHAN/PSEUDOEPHED 2.5-7.5/.8
1.2 DROPS ORAL
Status: DISCONTINUED | OUTPATIENT
Start: 2023-03-27 | End: 2023-03-27 | Stop reason: HOSPADM

## 2023-03-27 RX ORDER — MIDAZOLAM HYDROCHLORIDE 1 MG/ML
.25-5 INJECTION, SOLUTION INTRAMUSCULAR; INTRAVENOUS
Status: DISCONTINUED | OUTPATIENT
Start: 2023-03-27 | End: 2023-03-27 | Stop reason: HOSPADM

## 2023-03-27 RX ORDER — CYCLOBENZAPRINE HCL 10 MG
5 TABLET ORAL ONCE
Status: COMPLETED | OUTPATIENT
Start: 2023-03-27 | End: 2023-03-27

## 2023-03-27 RX ORDER — EPINEPHRINE 0.1 MG/ML
1 INJECTION INTRACARDIAC; INTRAVENOUS
Status: DISCONTINUED | OUTPATIENT
Start: 2023-03-27 | End: 2023-03-27 | Stop reason: HOSPADM

## 2023-03-27 RX ORDER — SODIUM CHLORIDE 9 MG/ML
75 INJECTION, SOLUTION INTRAVENOUS CONTINUOUS
Status: DISPENSED | OUTPATIENT
Start: 2023-03-27 | End: 2023-03-27

## 2023-03-27 RX ORDER — SODIUM CHLORIDE 9 MG/ML
INJECTION, SOLUTION INTRAVENOUS
Status: DISCONTINUED | OUTPATIENT
Start: 2023-03-27 | End: 2023-03-27 | Stop reason: HOSPADM

## 2023-03-27 RX ORDER — NALOXONE HYDROCHLORIDE 0.4 MG/ML
0.4 INJECTION, SOLUTION INTRAMUSCULAR; INTRAVENOUS; SUBCUTANEOUS
Status: DISCONTINUED | OUTPATIENT
Start: 2023-03-27 | End: 2023-03-27 | Stop reason: HOSPADM

## 2023-03-27 RX ORDER — ATROPINE SULFATE 0.1 MG/ML
0.5 INJECTION INTRAVENOUS
Status: DISCONTINUED | OUTPATIENT
Start: 2023-03-27 | End: 2023-03-27 | Stop reason: HOSPADM

## 2023-03-27 RX ORDER — FLUMAZENIL 0.1 MG/ML
0.2 INJECTION INTRAVENOUS
Status: DISCONTINUED | OUTPATIENT
Start: 2023-03-27 | End: 2023-03-27 | Stop reason: HOSPADM

## 2023-03-27 RX ORDER — SODIUM CHLORIDE 0.9 % (FLUSH) 0.9 %
5-40 SYRINGE (ML) INJECTION AS NEEDED
Status: DISCONTINUED | OUTPATIENT
Start: 2023-03-27 | End: 2023-03-28 | Stop reason: HOSPADM

## 2023-03-27 RX ADMIN — PROPOFOL 50 MG: 10 INJECTION, EMULSION INTRAVENOUS at 17:47

## 2023-03-27 RX ADMIN — PROPOFOL 40 MG: 10 INJECTION, EMULSION INTRAVENOUS at 17:56

## 2023-03-27 RX ADMIN — CITALOPRAM HYDROBROMIDE 40 MG: 20 TABLET ORAL at 09:35

## 2023-03-27 RX ADMIN — PIPERACILLIN AND TAZOBACTAM 3.38 G: 3; .375 INJECTION, POWDER, FOR SOLUTION INTRAVENOUS at 00:15

## 2023-03-27 RX ADMIN — SODIUM CHLORIDE 75 ML/HR: 9 INJECTION, SOLUTION INTRAVENOUS at 19:22

## 2023-03-27 RX ADMIN — SPIRONOLACTONE 50 MG: 25 TABLET ORAL at 09:35

## 2023-03-27 RX ADMIN — PROPOFOL 30 MG: 10 INJECTION, EMULSION INTRAVENOUS at 17:59

## 2023-03-27 RX ADMIN — PROPOFOL 50 MG: 10 INJECTION, EMULSION INTRAVENOUS at 17:43

## 2023-03-27 RX ADMIN — PIPERACILLIN AND TAZOBACTAM 3.38 G: 3; .375 INJECTION, POWDER, FOR SOLUTION INTRAVENOUS at 19:22

## 2023-03-27 RX ADMIN — SODIUM CHLORIDE, PRESERVATIVE FREE 10 ML: 5 INJECTION INTRAVENOUS at 19:27

## 2023-03-27 RX ADMIN — LOSARTAN POTASSIUM 50 MG: 50 TABLET, FILM COATED ORAL at 09:35

## 2023-03-27 RX ADMIN — SODIUM CHLORIDE, PRESERVATIVE FREE 10 ML: 5 INJECTION INTRAVENOUS at 14:00

## 2023-03-27 RX ADMIN — PROPOFOL 50 MG: 10 INJECTION, EMULSION INTRAVENOUS at 17:50

## 2023-03-27 RX ADMIN — PROPOFOL 40 MG: 10 INJECTION, EMULSION INTRAVENOUS at 17:41

## 2023-03-27 RX ADMIN — CARVEDILOL 6.25 MG: 6.25 TABLET, FILM COATED ORAL at 07:01

## 2023-03-27 RX ADMIN — PROPOFOL 30 MG: 10 INJECTION, EMULSION INTRAVENOUS at 18:03

## 2023-03-27 RX ADMIN — PROPOFOL 50 MG: 10 INJECTION, EMULSION INTRAVENOUS at 17:53

## 2023-03-27 RX ADMIN — ROSUVASTATIN CALCIUM 30 MG: 10 TABLET, FILM COATED ORAL at 21:34

## 2023-03-27 RX ADMIN — PROPOFOL 30 MG: 10 INJECTION, EMULSION INTRAVENOUS at 18:01

## 2023-03-27 RX ADMIN — PROPOFOL 30 MG: 10 INJECTION, EMULSION INTRAVENOUS at 18:05

## 2023-03-27 RX ADMIN — CYCLOBENZAPRINE 5 MG: 10 TABLET, FILM COATED ORAL at 01:41

## 2023-03-27 RX ADMIN — PROPOFOL 110 MG: 10 INJECTION, EMULSION INTRAVENOUS at 17:39

## 2023-03-27 RX ADMIN — SODIUM CHLORIDE: 900 INJECTION, SOLUTION INTRAVENOUS at 17:39

## 2023-03-27 RX ADMIN — PANTOPRAZOLE SODIUM 20 MG: 20 TABLET, DELAYED RELEASE ORAL at 07:01

## 2023-03-27 RX ADMIN — PIPERACILLIN AND TAZOBACTAM 3.38 G: 3; .375 INJECTION, POWDER, FOR SOLUTION INTRAVENOUS at 07:01

## 2023-03-27 RX ADMIN — CARVEDILOL 6.25 MG: 6.25 TABLET, FILM COATED ORAL at 19:22

## 2023-03-27 NOTE — PROGRESS NOTES
TRANSFER - IN REPORT:    Verbal report received from [facility] on [employer]  being received from 505(unit) for ordered procedure      Report consisted of patients Situation, Background, Assessment and   Recommendations(SBAR). Information from the following report(s) Pre Procedure Checklist was reviewed with the receiving nurse. Opportunity for questions and clarification was provided. Assessment completed upon patients arrival to unit and care assumed.

## 2023-03-27 NOTE — PROGRESS NOTES
RUR: 9% Low     MOISES: Anticipated discharge home. Patient's friend will provide transport home once medically stable. Follow-up with PCP/specialist.     Primary Contact: Jairon Desirees, [phone]    Care Management Interventions  PCP Verified by CM: Yes (Dr. Phan Landrum - seen 2 weeks ago per patient)  Mode of Transport at Discharge: Other (see comment)  Transition of Care Consult (CM Consult): Discharge Planning  Discharge Durable Medical Equipment: No  Physical Therapy Consult: No  Occupational Therapy Consult: No  Speech Therapy Consult: No  Support Systems: Friend/Neighbor  Confirm Follow Up Transport: Self  The Plan for Transition of Care is Related to the Following Treatment Goals : Home  Discharge Location  Patient Expects to be Discharged to[de-identified] Home    Reason for Admission:  Diverticulitis                    RUR Score:  9% Low            Plan for utilizing home health: Likely none         PCP: First and Last name:  Ranjit Barlow MD     Name of Practice:    Are you a current patient: Yes/No: Yes   Approximate date of last visit: 2 weeks ago per patient   Can you participate in a virtual visit with your PCP: Yes                    Current Advanced Directive/Advance Care Plan: Full Code    Healthcare Decision Maker:   Click here to complete [address] including selection of the Healthcare Decision Maker Relationship (ie \"Primary\")                  Transition of Care Plan:        Home    CM met with patient at bedside to introduce self and explain role. Patient lives with a friend in a 1 story home with 3 steps to enter. Patient was independent with ADL's and IADL's. Patient ambulates with the use of a cane and also owns a RW. CM verified patient's PCP, demographics and insurance. Preferred pharmacy is [facility] in [place] with no barriers obtaining needed prescriptions. Patient's friend will provide transport home once medically stable.      Emanuel Rankin, MSW   [phone]

## 2023-03-27 NOTE — ANESTHESIA PREPROCEDURE EVALUATION
Relevant Problems   No relevant active problems       Anesthetic History     PONV and other anesthesia complications     Comments: H/o intraoperative cardiac arrest     Review of Systems / Medical History  Patient summary reviewed, nursing notes reviewed and pertinent labs reviewed    Pulmonary    COPD      Smoker  Asthma        Neuro/Psych         Psychiatric history     Cardiovascular              CAD and cardiac stents    Exercise tolerance: >4 METS  Comments: EF 30-35%   GI/Hepatic/Renal     GERD: poorly controlled           Endo/Other        Obesity and arthritis     Other Findings              Physical Exam    Airway  Mallampati: III  TM Distance: 4 - 6 cm  Neck ROM: normal range of motion   Mouth opening: Normal     Cardiovascular    Rhythm: regular  Rate: normal         Dental    Dentition: Upper dentition intact and Lower dentition intact     Pulmonary  Breath sounds clear to auscultation               Abdominal  GI exam deferred       Other Findings            Anesthetic Plan    ASA: 3  Anesthesia type: MAC          Induction: Intravenous  Anesthetic plan and risks discussed with: Patient good minus

## 2023-03-27 NOTE — PROGRESS NOTES
TRANSFER - OUT REPORT:    Verbal report given to Siria Sparks at bedside(name) on [facility]on  being transferred to [facility](unit) for routine post - op       Report consisted of patients Situation, Background, Assessment and   Recommendations(SBAR). Information from the following report(s) SBAR, Procedure Summary, and Intake/Output was reviewed with the receiving nurse. Lines:   Peripheral IV 03/26/23 Anterior;Right Wrist (Active)   Site Assessment Clean, dry, & intact 03/27/23 0935   Phlebitis Assessment 0 03/27/23 0935   Infiltration Assessment 0 03/27/23 0935   Dressing Status Clean, dry, & intact 03/27/23 0935   Dressing Type Transparent;Tape 03/27/23 0935   Hub Color/Line Status Blue; Infusing 03/27/23 0935   Action Taken Open ports on tubing capped 03/27/23 0935   Alcohol Cap Used Yes 03/27/23 0935        Opportunity for questions and clarification was provided.       Patient transported with:   Registered Nurse

## 2023-03-27 NOTE — PROGRESS NOTES
Initial RN admission and assessment performed and documented in Endoscopy navigator. Patient evaluated by anesthesia in pre-procedure holding. All procedural vital signs, airway assessment, and level of consciousness information monitored and recorded by anesthesia staff on the anesthesia record. Report received from CRNA post procedure. Patient transported to recovery area by RN. Endoscope was pre-cleaned at bedside immediately following procedure by Shreri Greenfield. Per pt, pt took effient 3/24/23. MD aware.

## 2023-03-27 NOTE — PROCEDURES
[address]  Domenico Minaya, [address]  [phone]               Colonoscopy Operative Report      Indications:  Lower abdominal pain, diarrhea, rectal bleeding, abnormal CT    :  Josephine Mary MD    Staff: Endoscopy Technician-1: Anna Camp  Endoscopy RN-1: Marcela Blandon RN  Endoscopy RN-2: Jonathan Fatima RN     Referring Provider: Rea Tam MD    Sedation:  MAC anesthesia    Procedure Details:  After informed consent was obtained with all risks and benefits of procedure explained and preoperative exam completed, the patient was taken to the endoscopy suite and placed in the left lateral decubitus position. Upon sequential sedation as per above, a digital rectal exam was performed  And was normal.  The Olympus videocolonoscope  was inserted in the rectum and carefully advanced to the cecum, which was identified by the ileocecal valve and appendiceal orifice, terminal ileum. The quality of preparation was good. The colonoscope was slowly withdrawn with careful evaluation between folds. Retroflexion in the rectum was performed and was normal..     Findings:   Rectum: Grade 2 internal hemorrhoid(s); Sigmoid:     - Diverticulosis  Descending Colon: normal  Transverse Colon: normal  Ascending Colon: About 5-6 distinct raised (5-7 mm) erythematous lesions with surface erosion of unclear significance. Biopsies obtained. Otherwise colon mucosa appeared normal  Cecum: Lesions as seen in ascending colon. Terminal Ileum: normal    Interventions:  biopsy of right colon     Specimen Removed:   ID Type Source Tests Collected by Time Destination   1 : right colon biopsy Preservative Colon, Right  Tiarra Singh MD 3/27/2023 1757 Pathology       EBL:  Minimal    Complications:  None; patient tolerated the procedure well. Impression:  -About 5-6 distinct raised (5-7 mm) erythematous lesions with surface erosion of unclear significance. Biopsies obtained. -Moderate sigmoid colon diverticulosis with restricted mobility  -Medium sized internal hemorrhoids - likely source of recent bleeding.   -Otherwise colon mucosa appeared normal    Recommendations:   -Return patient to hospital floor for ongoing care. -Resume normal medication(s). -No Non-Steroidal Anti Inflammatorys (NSAIDS)      -Clear liquid diet and advance as tolerated to high fiber diet  -Begin fiber supplement daily - consider metamucil (or any other over the counter fiber supplement) 1 tablespoon in 12-16 oz water and increase to 2 tablespoons over 4-6 weeks  -Await pathology.  -For colon cancer screening in this average-risk patient, colonoscopy may be repeated in 10 years.  -Consider discharge in am if tolerating diet and no further pain. Discharge Disposition:  Home in the company of a  when able to ambulate.     Justin Smith MD  3/27/2023  6:15 PM

## 2023-03-27 NOTE — PROGRESS NOTES
Hospitalist Progress Note  Andrea Patel NP  Answering service: [phone] from in house phone        Date of Service:  3/27/2023  NAME:  Sachi Varela  :  1960  MRN:  [id number]      Admission Summary:   Sachi Varela is a 57 y.o. male who presents with abdominal pain. Patient first presented to the emergency room on 3/15/2023 for evaluation of abdominal pain and was diagnosed with diverticulitis. Patient was discharged to home with a course of Augmentin. Patient has completed the antibiotics however the pain got worse and represented to the emergency room again on 3/22/2023. Repeat CT scan was done at that time and appears pretty stable and again patient was discharged to home. Today patient presented to the ER again with worsening pain, also blood in the stool, he has contacted his gastroenterologist Dr. Cceile Ramirez prior to coming to the ER. Labs in the ER pretty much unremarkable. CT abdomen and pelvis done today shows soft tissue stranding anterior to the mid left colon is unchanged and may represent an omental infarct. There has been no significant change. Patient was admitted for further evaluation management. The patient denies any headache, blurry vision, sore throat, trouble swallowing, trouble with speech, chest pain, SOB, cough, fever, chills, N/V/D, abd pain, urinary symptoms, constipation, recent travels, sick contacts, focal or generalized neurological symptoms, falls, injuries, rashes, contact with COVID-19 diagnosed patients, hematemesis, melena, hemoptysis, hematuria, rashes, denies starting any new medications and denies any other concerns or problems besides as mentioned above.         Interval history / Subjective:   colonoscopy w/ Dr. Cecile Ramierz between 1-2 pm  Finished prep  No new complatints     Assessment & Plan:     Diverticulitis  -Patient with prolonged symptoms, symptoms [At Term] : at term not resolved after a course of oral antibiotics  - cw zosyn  - gi following, colonoscopy Monday     Hematochezia  -Likely related to diverticulitis, H&H is stable, monitor  -Hold Effient patient takes at home     IBS  -Patient with history of IBS, reports some cramping pain  -Likely his IBS playing a role in his symptoms     Hypertension  -Continue Coreg, losartan and Aldactone  -Monitor blood pressure     Dyslipidemia  -Continue statin     Depression/anxiety  -Continue Celexa          Code status: Full  Prophylaxis: [address] discussed with: patient, nurse, Dr. Heriberto Jane  Anticipated Disposition: Home Monday after colonoscopy? Hospital Problems  Date Reviewed: 7/9/2020            Codes Class Noted POA    Diverticulitis ICD-10-CM: K57.92  ICD-9-CM: 562.11  3/24/2023 Unknown           Review of Systems:   A comprehensive review of systems was negative except for that written in the HPI. Vital Signs:    Last 24hrs VS reviewed since prior progress note.  Most recent are:  Visit Vitals  /83 (BP 1 Location: Left upper arm, BP Patient Position: Lying)   Pulse 76   Temp 97.7 °F (36.5 °C)   Resp 14   Ht 5' 9\" (1.753 m)   Wt 95 kg (209 lb 8 oz)   SpO2 97%   BMI 30.94 kg/m²       No intake or output data in the 24 hours ending 03/27/23 1042       Physical Examination:     I had a face to face encounter with this patient and independently examined them on 3/27/2023 as outlined below:          General : alert x 3, awake, no acute distress,   HEENT: PEERL, EOMI, moist mucus membrane  Neck: supple, no JVD  Chest: Clear to auscultation bilaterally   CVS: S1 S2 heard, Capillary refill less than 2 seconds  Abd: soft/ non tender, non distended, BS physiological  Ext: no clubbing, no cyanosis, no edema, brisk 2+ DP pulses  Neuro/Psych: pleasant mood and affect, CN 2-12 grossly intact, sensory grossly within normal limit, Strength 5/5 in all extremities  Skin: warm            Data Review:    Review and/or order of [Normal Vaginal Route] : by normal vaginal route clinical lab test  Review and/or order of tests in the radiology section of CPT  Review and/or order of tests in the medicine section of CPT    I have independently reviewed and interpreted patient's lab and all other diagnostic data    Notes reviewed from all clinical/nonclinical/nursing services involved in patient's clinical care. Care coordination discussions were held with appropriate clinical/nonclinical/ nursing providers based on care coordination needs. Labs:     Recent Labs     03/27/23  0024 03/26/23  0309 03/25/23  0348 03/24/23  1341   WBC  --   --  7.8 9.1   HGB 15.5 14.7 13.6 15.2   HCT 46.1 44.2 40.4 46.9   PLT  --   --  257 310       Recent Labs     03/25/23  0348 03/24/23  1341    136   K 3.9 4.1   * 107   CO2 27 28   BUN 12 13   CREA 0.92 1.06   GLU 96 106*   CA 7.8* 8.7       Recent Labs     03/24/23  1341   ALT 25   AP 85   TBILI 0.6   TP 6.7   ALB 3.5   GLOB 3.2       Recent Labs     03/24/23  1704   INR 1.0   PTP 10.5        No results for input(s): FE, TIBC, PSAT, FERR in the last 72 hours. No results found for: FOL, RBCF   No results for input(s): PH, PCO2, PO2 in the last 72 hours. No results for input(s): CPK, CKNDX, TROIQ in the last 72 hours.     No lab exists for component: CPKMB  Lab Results   Component Value Date/Time    Cholesterol, total 137 11/14/2018 11:01 AM    HDL Cholesterol 51 11/14/2018 11:01 AM    LDL, calculated 64 11/14/2018 11:01 AM    Triglyceride 111 11/14/2018 11:01 AM     Lab Results   Component Value Date/Time    Glucose (POC) 81 03/27/2023 02:46 AM     Lab Results   Component Value Date/Time    Color YELLOW/STRAW 03/25/2023 12:15 PM    Appearance CLEAR 03/25/2023 12:15 PM    Specific gravity 1.026 03/25/2023 12:15 PM    Specific gravity 1.025 03/22/2023 05:09 AM    pH (UA) 5.5 03/25/2023 12:15 PM    Protein Negative 03/25/2023 12:15 PM    Glucose Negative 03/25/2023 12:15 PM    Ketone Negative 03/25/2023 12:15 PM    Bilirubin Negative 03/25/2023 12:15 PM    Urobilinogen 0.2 03/25/2023 12:15 PM    Nitrites Negative 03/25/2023 12:15 PM    Leukocyte Esterase Negative 03/25/2023 12:15 PM    Epithelial cells FEW 03/25/2023 12:15 PM    Bacteria Negative 03/25/2023 12:15 PM    WBC 0-4 03/25/2023 12:15 PM    RBC 10-20 03/25/2023 12:15 PM         Medications Reviewed:     Current Facility-Administered Medications   Medication Dose Route Frequency    calcium carbonate (TUMS) chewable tablet 200 mg [elemental]  200 mg Oral Q4H PRN    melatonin tablet 3 mg  3 mg Oral QHS PRN    carvediloL (COREG) tablet 6.25 mg  6.25 mg Oral BID WITH MEALS    citalopram (CELEXA) tablet 40 mg  40 mg Oral DAILY    fluticasone propionate (FLONASE) 50 mcg/actuation nasal spray 1 Spray  1 Spray Both Nostrils BID PRN    losartan (COZAAR) tablet 50 mg  50 mg Oral DAILY    pantoprazole (PROTONIX) tablet 20 mg  20 mg Oral ACB    rosuvastatin (CRESTOR) tablet 30 mg  30 mg Oral QHS    spironolactone (ALDACTONE) tablet 50 mg  50 mg Oral DAILY    piperacillin-tazobactam (ZOSYN) 3.375 g in 0.9% sodium chloride (MBP/ADV) 100 mL MBP  3.375 g IntraVENous Q8H    sodium chloride (NS) flush 5-40 mL  5-40 mL IntraVENous Q8H    sodium chloride (NS) flush 5-40 mL  5-40 mL IntraVENous PRN    acetaminophen (TYLENOL) tablet 650 mg  650 mg Oral Q6H PRN    Or    acetaminophen (TYLENOL) suppository 650 mg  650 mg Rectal Q6H PRN    polyethylene glycol (MIRALAX) packet 17 g  17 g Oral DAILY PRN    ondansetron (ZOFRAN ODT) tablet 4 mg  4 mg Oral Q8H PRN    Or    ondansetron (ZOFRAN) injection 4 mg  4 mg IntraVENous Q6H PRN     ______________________________________________________________________  EXPECTED LENGTH OF STAY: - - -  ACTUAL LENGTH OF STAY:          3                 Etta Rosales V, NP [FreeTextEntry1] : 8-10

## 2023-03-28 VITALS
DIASTOLIC BLOOD PRESSURE: 71 MMHG | HEIGHT: 69 IN | BODY MASS INDEX: 31.03 KG/M2 | OXYGEN SATURATION: 92 % | HEART RATE: 69 BPM | TEMPERATURE: 97.5 F | RESPIRATION RATE: 18 BRPM | SYSTOLIC BLOOD PRESSURE: 114 MMHG | WEIGHT: 209.5 LBS

## 2023-03-28 PROCEDURE — 74011250637 HC RX REV CODE- 250/637: Performed by: FAMILY MEDICINE

## 2023-03-28 PROCEDURE — 74011000258 HC RX REV CODE- 258: Performed by: FAMILY MEDICINE

## 2023-03-28 PROCEDURE — 74011250636 HC RX REV CODE- 250/636: Performed by: FAMILY MEDICINE

## 2023-03-28 RX ADMIN — SPIRONOLACTONE 50 MG: 25 TABLET ORAL at 08:08

## 2023-03-28 RX ADMIN — PIPERACILLIN AND TAZOBACTAM 3.38 G: 3; .375 INJECTION, POWDER, FOR SOLUTION INTRAVENOUS at 02:38

## 2023-03-28 RX ADMIN — LOSARTAN POTASSIUM 50 MG: 50 TABLET, FILM COATED ORAL at 08:08

## 2023-03-28 RX ADMIN — CARVEDILOL 6.25 MG: 6.25 TABLET, FILM COATED ORAL at 07:00

## 2023-03-28 RX ADMIN — CITALOPRAM HYDROBROMIDE 40 MG: 20 TABLET ORAL at 08:08

## 2023-03-28 RX ADMIN — PANTOPRAZOLE SODIUM 20 MG: 20 TABLET, DELAYED RELEASE ORAL at 06:58

## 2023-03-28 NOTE — PROGRESS NOTES
I have reviewed discharge instructions with the patient. The patient verbalized understanding. Discharge medications reviewed with patient and appropriate educational materials and side effects teaching were provided. PIV removed. Patient declined to wait for volunteer assistance for discharge. Patient is alert and oriented and up ad dona, patient walked himself down where his ride was waiting for him.

## 2023-03-28 NOTE — PROGRESS NOTES
[address]  [address] [facility], [address]       GI PROGRESS NOTE  Danni Ferguson, [facility], [place] office  [phone] NP in-hospital cell phone M-F until 4:30  After 5pm or on weekends, please call  for physician on call      NAME: Ezio Treadwell   :  1960   MRN:  [id number]       Subjective:   He's feeling great, no abdominal pain, tolerating diet. Objective:     VITALS:   Last 24hrs VS reviewed since prior progress note. Most recent are:  Visit Vitals  /71   Pulse 69   Temp 97.5 °F (36.4 °C)   Resp 18   Ht 5' 9\" (1.753 m)   Wt 95 kg (209 lb 8 oz)   SpO2 92%   BMI 30.94 kg/m²       PHYSICAL EXAM:  General: Cooperative, no acute distress    Neurologic:  Alert and oriented X 3. HEENT: EOMI, no scleral icterus   Lungs:  No increased WOB  Heart:  regular rate  Abdomen: Soft, non-distended, no tenderness. Extremities: warm  Psych:   Good insight. Not anxious or agitated. Lab Data Reviewed:     No results found for this or any previous visit (from the past 24 hour(s)). Assessment:     Rectal bleeding: colonoscopy 3/27/23: grade 2 internal hemorrhoids (likely source of recent bleeding), 5-6 distinct raised (5-7 mm) erythematous lesions with surface erosions of unclear significance; moderate sigmoid diverticulosis with restricted mobility   LLQ pain recent diverticulitis   Castellanos's esophagus      Patient Active Problem List   Diagnosis Code    Encounter for screening colonoscopy Z12.11    Sigmoid diverticulosis K57.30    Chronic bilateral low back pain without sciatica M54.50, G89.29    Nausea and vomiting R11.2    Gynecomastia, male N58    Cigarette smoker F17.210    Chronic obstructive pulmonary disease ([facility] [address] J44.9    Coronary artery disease involving native coronary artery of native heart without angina pectoris I25.10    Depression F32. A    CHF (congestive heart failure) (HCC) I50.9    Vitamin D deficiency E55.9    Depression, major, recurrent, moderate (HCC) F33.1    S/P cardiac cath Z98.890    Diverticulitis K57.92     Plan:     Avoid NSAID's  Follow-up pathology  High fiber diet as tolerated - fiber supplement  Outpatient follow-up     Signed By: Eduardo Cannon NP     3/28/2023  9:42 AM

## 2023-03-28 NOTE — DISCHARGE SUMMARY
Discharge Summary       PATIENT ID: Terri Garcia  MRN: [id number]   YOB: 1960    DATE OF ADMISSION: 3/24/2023  4:20 PM    DATE OF DISCHARGE: 3/28/2023   PRIMARY CARE PROVIDER: Juanis Hanson MD     ATTENDING PHYSICIAN: Dwayne Peralta MD  DISCHARGING PROVIDER: Yuri Auguste NP    To contact this individual call [phone] and ask the  to page. If unavailable ask to be transferred the Adult Hospitalist Department. CONSULTATIONS: IP CONSULT TO GASTROENTEROLOGY    PROCEDURES/SURGERIES: Procedure(s):  COLONOSCOPY  COLON BIOPSY    ADMITTING DIAGNOSES & HOSPITAL COURSE:   Terri Garcia is a 57 y.o. male who presents with abdominal pain. Patient first presented to the emergency room on 3/15/2023 for evaluation of abdominal pain and was diagnosed with diverticulitis. Patient was discharged to home with a course of Augmentin. Patient has completed the antibiotics however the pain got worse and represented to the emergency room again on 3/22/2023. Repeat CT scan was done at that time and appears pretty stable and again patient was discharged to home. Today patient presented to the ER again with worsening pain, also blood in the stool, he has contacted his gastroenterologist Dr. Nisha Cheek prior to coming to the ER. Labs in the ER pretty much unremarkable. CT abdomen and pelvis done today shows soft tissue stranding anterior to the mid left colon is unchanged and may represent an omental infarct. There has been no significant change. Patient was admitted for further evaluation management.      The patient denies any headache, blurry vision, sore throat, trouble swallowing, trouble with speech, chest pain, SOB, cough, fever, chills, N/V/D, abd pain, urinary symptoms, constipation, recent travels, sick contacts, focal or generalized neurological symptoms, falls, injuries, rashes, contact with COVID-19 diagnosed patients, hematemesis, melena, hemoptysis, hematuria, rashes, denies starting any new medications and denies any other concerns or problems besides as mentioned above. DISCHARGE DIAGNOSES / PLAN:      Diverticulitis  Failed outpatient treatment with oral antibiotics, persistent symptoms  Recd PIP Mina, no need for more abtx  3/27 colonoscopy with gastroenterology  Per GI, no NSAIDs, fiber supplement daily, 1 tablespoon in 12-16 oz water and increase to 2 tablespoons over 4-6 weeks  Advance diet as tolerated           Hematochezia  Hemoglobin remained stable           IBS  . History IBS  Continue supportive care           Hypertension  Blood pressure currently controlled  Continue carvedilol  Continue spironolactone  Continue losartan     Dyslipidemia  Stable  Continue rosuvastatin     Depression/anxiety  Stable  Continue citalopram          PENDING TEST RESULTS:   At the time of discharge the following test results are still pending:     FOLLOW UP APPOINTMENTS:    Follow-up Information       Follow up With Specialties Details Why Contact Info    Fadumo Vega MD Gastroenterology Schedule an appointment as soon as possible for a visit  Amado Nash  [phone]      Kristian Wade, [address]   [address] [facility],Third Floor [address] [phone]               ADDITIONAL CARE RECOMMENDATIONS:     DIET: Regular Diet      ACTIVITY: Activity as tolerated    WOUND CARE: na    EQUIPMENT needed: na      DISCHARGE MEDICATIONS:  Current Discharge Medication List        CONTINUE these medications which have NOT CHANGED    Details   omeprazole (PRILOSEC) 40 mg capsule Take 40 mg by mouth daily. ketorolac (TORADOL) 10 mg tablet Take 1 Tablet by mouth every six (6) hours as needed for Pain. Qty: 15 Tablet, Refills: 0      prasugreL (Effient) 10 mg tablet Take 1 Tab by mouth daily. Qty: 30 Tab, Refills: 5      citalopram (CELEXA) 40 mg tablet Take 40 mg by mouth daily.       dicyclomine (BENTYL) 10 mg capsule Take 10 mg by mouth two (2) times a day.      fluticasone propionate (FLONASE) 50 mcg/actuation nasal spray 1 [place] by Both Nostrils route three (3) times daily as needed for Rhinitis. cyclobenzaprine (FLEXERIL) 10 mg tablet Take 0.5-1 Tabs by mouth as needed for Muscle Spasm(s). Qty: 30 Tab, Refills: 0    Associated Diagnoses: Chronic bilateral low back pain without sciatica      rosuvastatin (CRESTOR) 10 mg tablet Take 3 Tabs by mouth nightly. Qty: 90 Tab, Refills: 0      spironolactone (ALDACTONE) 50 mg tablet Take 1 Tab by mouth daily. Qty: 30 Tab, Refills: 6      losartan (COZAAR) 50 mg tablet Take 50 mg by mouth daily. carvedilol (COREG) 6.25 mg tablet Take 6.25 mg by mouth two (2) times daily (with meals). Indications: HYPERTENSION               NOTIFY YOUR PHYSICIAN FOR ANY OF THE FOLLOWING:   Fever over 101 degrees for 24 hours. Chest pain, shortness of breath, fever, chills, nausea, vomiting, diarrhea, change in mentation, falling, weakness, bleeding. Severe pain or pain not relieved by medications. Or, any other signs or symptoms that you may have questions about.     DISPOSITION:   x Home With:   OT  PT  HH  RN       Long term SNF/Inpatient Rehab    Independent/assisted living    Hospice    Other:       PATIENT CONDITION AT DISCHARGE:     Functional status    Poor     Deconditioned    x Independent      Cognition    x Lucid     Forgetful     Dementia      Catheters/lines (plus indication)    [place]     PICC     PEG    x None      Code status   x Full code     DNR      PHYSICAL EXAMINATION AT DISCHARGE:    General : alert x 3, awake, no acute distress,   HEENT: PEERL, EOMI, moist mucus membrane, TM clear  Neck: supple, no JVD, no meningeal signs  Chest: Clear to auscultation bilaterally   CVS: S1 S2 heard, Capillary refill less than 2 seconds  Abd: soft/ Non tender, non distended, BS physiological,   Ext: no clubbing, no cyanosis, no edema, brisk 2+ DP pulses  Neuro/Psych: pleasant mood and affect, CN 2-12 grossly intact, sensory grossly within normal limit, Strength 5/5 in all extremities, DTR 1+ x 4  Skin: warm     CHRONIC MEDICAL DIAGNOSES:  Problem List as of 3/28/2023 Date Reviewed: 7/9/2020            Codes Class Noted - Resolved    S/P cardiac cath ICD-10-CM: Z98.890  ICD-9-CM: V45.89  7/14/2020 - Present        Diverticulitis ICD-10-CM: K57.92  ICD-9-CM: 562.11  3/24/2023 - Present        Depression, major, recurrent, moderate ([facility] ICD-10-CM: F33.1  ICD-9-CM: 296.32  2/14/2019 - Present        Vitamin D deficiency ICD-10-CM: E55.9  ICD-9-CM: 268.9  1/3/2019 - Present        Chronic bilateral low back pain without sciatica ICD-10-CM: M54.50, G89.29  ICD-9-CM: 724.2, 338.29  11/8/2018 - Present        Nausea and vomiting ICD-10-CM: R11.2  ICD-9-CM: 787.01  11/8/2018 - Present        Gynecomastia, male ICD-10-CM: N62  ICD-9-CM: 611.1  11/8/2018 - Present        Cigarette smoker ICD-10-CM: F17.210  ICD-9-CM: 305.1  11/8/2018 - Present        Chronic obstructive pulmonary disease ([facility] ICD-10-CM: J44.9  ICD-9-CM: 583  11/8/2018 - Present        Coronary artery disease involving native coronary artery of native heart without angina pectoris ICD-10-CM: I25.10  ICD-9-CM: 414.01  11/8/2018 - Present        Depression ICD-10-CM: F32. A  ICD-9-CM: 670  11/8/2018 - Present        CHF (congestive heart failure) ([facility] ICD-10-CM: I50.9  ICD-9-CM: 428.0  11/8/2018 - Present        Encounter for screening colonoscopy ICD-10-CM: Z12.11  ICD-9-CM: V76.51  5/1/2013 - Present        Sigmoid diverticulosis ICD-10-CM: K57.30  ICD-9-CM: 562.10  5/1/2013 - Present           Greater than 31 minutes were spent with the patient on counseling and coordination of care    Signed:   Chepe Dasilva NP  3/28/2023  10:45 AM

## 2023-03-28 NOTE — DISCHARGE INSTRUCTIONS
Discharge Instructions       PATIENT ID: Pamela Trevino  MRN: [id number]   YOB: 1960    DATE OF ADMISSION: 3/24/2023  4:20 PM    DATE OF DISCHARGE: 3/28/2023    PRIMARY CARE PROVIDER: Abdi Colunga MD     ATTENDING PHYSICIAN: Sergio Thurman NP  DISCHARGING PROVIDER: Catalina Garcia NP    To contact this individual call [phone] and ask the  to page. If unavailable ask to be transferred the Adult Hospitalist Department. DISCHARGE DIAGNOSES diverticulitis    CONSULTATIONS: IP CONSULT TO GASTROENTEROLOGY    PROCEDURES/SURGERIES: Procedure(s):  COLONOSCOPY  COLON BIOPSY    PENDING TEST RESULTS:   At the time of discharge the following test results are still pending: BX    FOLLOW UP APPOINTMENTS:   Follow-up Information       Follow up With Specialties Details Why Contact Info    Ellie Navarrete MD Gastroenterology Schedule an appointment as soon as possible for a visit  [place] [address]  [phone]      Abdi Colunga, [address]   [address] [facility],Third Floor [id number] [phone]               ADDITIONAL CARE RECOMMENDATIONS: add metamucil    DIET: Regular Diet, advance to high fiber      ACTIVITY: Activity as tolerated    WOUND CARE: na    EQUIPMENT needed: na      DISCHARGE MEDICATIONS:   See Medication Reconciliation Form    It is important that you take the medication exactly as they are prescribed. Keep your medication in the bottles provided by the pharmacist and keep a list of the medication names, dosages, and times to be taken in your wallet. Do not take other medications without consulting your doctor. NOTIFY YOUR PHYSICIAN FOR ANY OF THE FOLLOWING:   Fever over 101 degrees for 24 hours. Chest pain, shortness of breath, fever, chills, nausea, vomiting, diarrhea, change in mentation, falling, weakness, bleeding. Severe pain or pain not relieved by medications.   Or, any other signs or symptoms that you may have questions about.      DISPOSITION:   x Home With:   OT  PT  HH  RN       SNF/Inpatient Rehab/LTAC    Independent/assisted living    Hospice    Other:           Signed:   Jaime Joseph NP  3/28/2023  10:39 AM

## 2023-03-28 NOTE — ANESTHESIA POSTPROCEDURE EVALUATION
Post-Anesthesia Evaluation and Assessment    Patient: Mami Mei MRN: [id number]  SSN: [id number]    YOB: 1960  Age: 57 y.o. Sex: male         Cardiovascular Function/Vital Signs  Visit Vitals  /71   Pulse 69   Temp 36.4 °C (97.5 °F)   Resp 18   Ht 5' 9\" (1.753 m)   Wt 95 kg (209 lb 8 oz)   SpO2 92%   BMI 30.94 kg/m²       Patient is status post MAC anesthesia for Procedure(s):  COLONOSCOPY  COLON BIOPSY. Nausea/Vomiting: None    Postoperative hydration reviewed and adequate. Pain:  Pain Scale 1: Numeric (0 - 10) (03/27/23 1730)  Pain Intensity 1: 0 (03/27/23 1730)   Managed    Neurological Status: At baseline    Mental Status, Level of Consciousness: Alert and  oriented to person, place, and time    Pulmonary Status:   O2 Device: None (Room air) (03/27/23 2134)   Adequate oxygenation and airway patent    Complications related to anesthesia: None    Post-anesthesia assessment completed. No concerns    Signed By: Carmela Leroy MD     March 28, 2023              Procedure(s):  COLONOSCOPY  COLON BIOPSY.     MAC    <BSHSIANPOST>    INITIAL Post-op Vital signs:   Vitals Value Taken Time   /71 03/28/23 0807   Temp 36.4 °C (97.5 °F) 03/28/23 0807   Pulse 69 03/28/23 0807   Resp 18 03/28/23 0807   SpO2 92 % 03/28/23 0807

## 2023-05-11 RX ORDER — SPIRONOLACTONE 50 MG/1
TABLET, FILM COATED ORAL DAILY
COMMUNITY
Start: 2017-08-04

## 2023-05-11 RX ORDER — OMEPRAZOLE 40 MG/1
40 CAPSULE, DELAYED RELEASE ORAL DAILY
COMMUNITY

## 2023-05-11 RX ORDER — FLUTICASONE PROPIONATE 50 MCG
1 SPRAY, SUSPENSION (ML) NASAL 3 TIMES DAILY PRN
COMMUNITY

## 2023-05-11 RX ORDER — PRASUGREL 10 MG/1
TABLET, FILM COATED ORAL DAILY
COMMUNITY
Start: 2020-07-14

## 2023-05-11 RX ORDER — KETOROLAC TROMETHAMINE 10 MG/1
TABLET, FILM COATED ORAL EVERY 6 HOURS PRN
COMMUNITY
Start: 2023-03-22

## 2023-05-11 RX ORDER — LOSARTAN POTASSIUM 50 MG/1
50 TABLET ORAL DAILY
COMMUNITY

## 2023-05-11 RX ORDER — CARVEDILOL 6.25 MG/1
TABLET ORAL 2 TIMES DAILY WITH MEALS
COMMUNITY

## 2023-05-11 RX ORDER — CYCLOBENZAPRINE HCL 10 MG
TABLET ORAL PRN
COMMUNITY
Start: 2018-11-08

## 2023-05-11 RX ORDER — DICYCLOMINE HYDROCHLORIDE 10 MG/1
CAPSULE ORAL 2 TIMES DAILY
COMMUNITY

## 2023-05-11 RX ORDER — CITALOPRAM 40 MG/1
40 TABLET ORAL DAILY
COMMUNITY

## 2023-05-11 RX ORDER — ROSUVASTATIN CALCIUM 10 MG/1
TABLET, COATED ORAL
COMMUNITY
Start: 2018-11-08

## 2024-04-12 NOTE — H&P
History and Physical    Date of Service:  3/24/2023  Primary Care Provider: Shaheed Williamson MD  Source of information: The patient and Chart review    Chief Complaint: Rectal Bleeding      History of Presenting Illness:   Sarah Manzanares is a 57 y.o. male who presents with abdominal pain. Patient first presented to the emergency room on 3/15/2023 for evaluation of abdominal pain and was diagnosed with diverticulitis. Patient was discharged to home with a course of Augmentin. Patient has completed the antibiotics however the pain got worse and represented to the emergency room again on 3/22/2023. Repeat CT scan was done at that time and appears pretty stable and again patient was discharged to home. Today patient presented to the ER again with worsening pain, also blood in the stool, he has contacted his gastroenterologist Dr. Helen Patel prior to coming to the ER. Labs in the ER pretty much unremarkable. CT abdomen and pelvis done today shows soft tissue stranding anterior to the mid left colon is unchanged and may represent an omental infarct. There has been no significant change. Patient was admitted for further evaluation management. The patient denies any headache, blurry vision, sore throat, trouble swallowing, trouble with speech, chest pain, SOB, cough, fever, chills, N/V/D, abd pain, urinary symptoms, constipation, recent travels, sick contacts, focal or generalized neurological symptoms, falls, injuries, rashes, contact with COVID-19 diagnosed patients, hematemesis, melena, hemoptysis, hematuria, rashes, denies starting any new medications and denies any other concerns or problems besides as mentioned above. REVIEW OF SYSTEMS:  A comprehensive review of systems was negative except for that written in the History of Present Illness.      Past Medical History:   Diagnosis Date    Adverse effect of anesthesia     \"HEART STOPPED\" DURING ACHILLES TENDON REPAIR - WAS TOLD HE NEEDED TO GO SEE HIS HEART DOCTOR    Arthritis     osteo-- primarily affecting back, hands    Asthma     last exacerbation 1 yr ago. Uses albuterol rarely only. Never hospitalized    Chronic obstructive pulmonary disease (HCC)     Chronic pain     BACK/HIPS    Depression     Eczema     GERD (gastroesophageal reflux disease)     occasional only    Heart attack [facility]) 2006    Dr Eamon Bojorquez;  stents x2. Last stress test 2009--WNL    Hypercholesteremia     IBS (irritable bowel syndrome)     Ill-defined condition     OBESE PER PT    Ill-defined condition     ATOPIC DERMATITIS      Past Surgical History:   Procedure Laterality Date    HX ORTHOPAEDIC  2008    Rt knee reconstruction    HX ORTHOPAEDIC      RT ACHILLES TENDON REPAIR    HX OTHER SURGICAL      rectal surgery    HX TONSILLECTOMY      IN UNLISTED PROCEDURE CARDIAC SURGERY  2006    stent placed s/p MI     Prior to Admission medications    Medication Sig Start Date End Date Taking? Authorizing Provider   dicyclomine (BENTYL) 10 mg/5 mL soln oral solution Take 10 mL by mouth every six (6) hours for 5 days. 3/22/23 3/27/23  Florencio García MD   ketorolac (TORADOL) 10 mg tablet Take 1 Tablet by mouth every six (6) hours as needed for Pain. 3/22/23   Sandip Fitzgerald MD   ondansetron hcl (Zofran) 4 mg tablet Take 1 Tablet by mouth every eight (8) hours as needed for Nausea or Vomiting. 1/5/22   Zen Wallis NP   promethazine (PHENERGAN) 25 mg tablet Take 1 Tablet by mouth every six (6) hours as needed for Nausea. 1/5/22   Zen Wallis NP   prasugreL (Effient) 10 mg tablet Take 1 Tab by mouth daily. 7/14/20   Thelma Simon MD   omeprazole (PriLOSEC OTC) 20 mg tablet Take 20 mg by mouth daily. Provider, Historical   varenicline (Chantix) 1 mg tablet Take 1 mg by mouth two (2) times daily (after meals). Provider, Historical   ondansetron (ZOFRAN ODT) 8 mg disintegrating tablet Take 1 Tab by mouth every eight (8) hours as needed for Nausea.  11/6/19   Rosana Charles MD 0835)  Establish and maintain optimal ostomy function: Nutrition consult     Problem: Genitourinary - Adult  Goal: Absence of urinary retention  Outcome: Progressing  Flowsheets (Taken 4/12/2024 0835)  Absence of urinary retention: Monitor intake/output and perform bladder scan as needed  Goal: Urinary catheter remains patent  Outcome: Progressing     Problem: Metabolic/Fluid and Electrolytes - Adult  Goal: Electrolytes maintained within normal limits  Outcome: Progressing  Flowsheets (Taken 4/12/2024 0835)  Electrolytes maintained within normal limits: Monitor labs and assess patient for signs and symptoms of electrolyte imbalances  Goal: Hemodynamic stability and optimal renal function maintained  Outcome: Progressing  Flowsheets (Taken 4/12/2024 0835)  Hemodynamic stability and optimal renal function maintained: Monitor labs and assess for signs and symptoms of volume excess or deficit  Goal: Glucose maintained within prescribed range  Outcome: Progressing  Flowsheets (Taken 4/12/2024 0835)  Glucose maintained within prescribed range: Monitor blood glucose as ordered     Problem: Skin/Tissue Integrity - Adult  Goal: Skin integrity remains intact  Outcome: Progressing  Flowsheets (Taken 4/12/2024 0835)  Skin Integrity Remains Intact:   Monitor for areas of redness and/or skin breakdown   Assess vascular access sites hourly  Goal: Incisions, wounds, or drain sites healing without S/S of infection  Outcome: Progressing  Goal: Oral mucous membranes remain intact  Outcome: Progressing  Flowsheets (Taken 4/12/2024 0835)  Oral Mucous Membranes Remain Intact: Assess oral mucosa and hygiene practices     Problem: Hematologic - Adult  Goal: Maintains hematologic stability  Outcome: Progressing  Flowsheets (Taken 4/12/2024 0835)  Maintains hematologic stability: Assess for signs and symptoms of bleeding or hemorrhage     Problem: Musculoskeletal - Adult  Goal: Return mobility to safest level of function  Outcome:  citalopram (CELEXA) 20 mg tablet Take 40 mg by mouth daily. Provider, Historical   dicyclomine (BENTYL) 10 mg capsule Take 10 mg by mouth two (2) times a day. Provider, Historical   fluticasone propionate (FLONASE) 50 mcg/actuation nasal spray 1 [place] by Both Nostrils route three (3) times daily as needed for Rhinitis. Provider, Historical   cyclobenzaprine (FLEXERIL) 10 mg tablet Take 0.5-1 Tabs by mouth as needed for Muscle Spasm(s). 11/8/18   Anton Maria MD   rosuvastatin (CRESTOR) 10 mg tablet Take 3 Tabs by mouth nightly. 11/8/18   Anton Maria MD   spironolactone (ALDACTONE) 50 mg tablet Take 1 Tab by mouth daily. 8/4/17   Lisa Simmons MD   losartan (COZAAR) 50 mg tablet Take 50 mg by mouth daily. Other, MD Slava   carvedilol (COREG) 6.25 mg tablet Take 6.25 mg by mouth two (2) times daily (with meals). Indications: HYPERTENSION    Provider, Historical     Allergies   Allergen Reactions    Lisinopril Other (comments)     Elevated K+    Aspirin Other (comments)     GI Bleeding    Codeine Nausea and Vomiting    Hydrocodone Other (comments)     hypotension      Family History   Family history unknown: Yes      Social History:  reports that he has been smoking. He has been smoking an average of .5 packs per day. He has never used smokeless tobacco. He reports current drug use. Drug: Marijuana. He reports that he does not drink alcohol.    Social Determinants of Health     Tobacco Use: High Risk    Smoking Tobacco Use: Every Day    Smokeless Tobacco Use: Never    Passive Exposure: Not on file   Alcohol Use: Not on file   Financial Resource Strain: Not on file   Food Insecurity: Not on file   Transportation Needs: Not on file   Physical Activity: Not on file   Stress: Not on file   Social Connections: Not on file   Intimate Partner Violence: Not on file   Depression: Not on file   Housing Stability: Not on file        Medications were reconciled to the best of my ability given all available resources at the time of admission. Route is PO if not otherwise noted. Family and social history were personally reviewed, all pertinent and relevant details are outlined as above. Objective:   Visit Vitals  /81   Pulse 100   Temp 98 °F (36.7 °C)   Resp 18   Ht 5' 9\" (1.753 m)   Wt 96.4 kg (212 lb 8.4 oz)   SpO2 96%   BMI 31.38 kg/m²      O2 Device: None (Room air)    PHYSICAL EXAM:   General: Alert x oriented x 3, awake, no acute distress,   HEENT: PEERL, EOMI, moist mucus membranes  Neck: Supple, no JVD, no meningeal signs  Chest: Clear to auscultation bilaterally   CVS: RRR, S1 S2 heard, no murmurs/rubs/gallops  Abd: Soft, non-tender, non-distended, +bowel sounds   Ext: No clubbing, no cyanosis, no edema  Neuro/Psych: Pleasant mood and affect, CN 2-12 grossly intact, sensory grossly within normal limit, Strength 5/5 in all extremities, DTR 1+ x 4  Cap refill: Brisk, less than 3 seconds  Pulses: 2+, symmetric in all extremities  Skin: Warm, dry, without rashes or lesions    Data Review:   I have independently reviewed and interpreted patient's lab and all other diagnostic data    Abnormal Labs Reviewed   METABOLIC PANEL, COMPREHENSIVE - Abnormal; Notable for the following components:       Result Value    Anion gap 1 (*)     Glucose 106 (*)     All other components within normal limits       All Micro Results       None            IMAGING:   CT ABD PELV W CONT   Final Result      1. Slight soft tissue stranding anterior to the mid left colon is unchanged and   may represent an omental infarct. There has been no significant change.            ECG/ECHO:    Results for orders placed or performed during the hospital encounter of 01/05/22   EKG, 12 LEAD, INITIAL   Result Value Ref Range    Ventricular Rate 82 BPM    Atrial Rate 82 BPM    P-R Interval 168 ms    QRS Duration 106 ms    Q-T Interval 390 ms    QTC Calculation (Bezet) 455 ms    Calculated P Axis 45 degrees    Calculated R Axis 24 degrees Calculated T Axis 7 degrees    Diagnosis       Normal sinus rhythm Nonspecific intraventricular conduction delay  Possible Inferior infarct , age undetermined  Abnormal ECG  When compared with ECG of 14-JUL-2020 17:15,  No significant change was found  Confirmed by Ernesto Patel M.D., Yana Swan ([id number]) on 1/5/2022 2:59:05 PM            Notes reviewed from all clinical/nonclinical/nursing services involved in patient's clinical care. Care coordination discussions were held with appropriate clinical/nonclinical/ nursing providers based on care coordination needs. Assessment:   Given the patient's current clinical presentation, there is a high level of concern for decompensation if discharged from the emergency department. Complex decision making was performed, which includes reviewing the patient's available past medical records, laboratory results, and imaging studies. Active Problems:    Diverticulitis (3/24/2023)        Plan:     Diverticulitis  -Patient with prolonged symptoms, symptoms not resolved after a course of oral antibiotics  -Admit for further evaluation, start Zosyn, gastroenterology consult, patient prefers to have solid diet  -Monitor daily labs    Hematochezia  -Likely related to diverticulitis, H&H is stable  -Hold Effient patient takes at home, monitor H&H    IBS  -Patient with history of IBS, reports some cramping pain  -Likely his IBS playing a role in his symptoms    Hypertension  -Continue Coreg, losartan and Aldactone  -Monitor blood pressure    Dyslipidemia  -Continue statin    Depression/anxiety  -Continue Celexa    Estimated length of stay is 2 midnights. DIET: No diet orders on file   ISOLATION PRECAUTIONS: There are currently no Active Isolations  CODE STATUS: No Order   DVT PROPHYLAXIS: SCDs  FUNCTIONAL STATUS PRIOR TO HOSPITALIZATION: Fully active and ambulatory; able to carry on all self-care without restriction.   Ambulatory status/function: By self   EARLY MOBILITY ASSESSMENT: Recommend routine ambulation while hospitalized with the assistance of nursing staff  ANTICIPATED DISCHARGE: 24-48 hours. ANTICIPATED DISPOSITION: Home  EMERGENCY CONTACT/SURROGATE DECISION MAKER:     CRITICAL CARE WAS PERFORMED FOR THIS ENCOUNTER: NO.      Signed By: Annabelle Moss MD     March 24, 2023         Please note that this dictation may have been completed with Dragon, the computer voice recognition software. Quite often unanticipated grammatical, syntax, homophones, and other interpretive errors are inadvertently transcribed by the computer software. Please disregard these errors. Please excuse any errors that have escaped final proofreading.

## (undated) DEVICE — ANGIOGRAPHIC CATHETER: Brand: IMPULSE™

## (undated) DEVICE — KIT HND CTRL 3 W STPCOCK ROT END 54IN PREM HI PRSS TBNG AT

## (undated) DEVICE — CUSTOM KT PTCA INFL DEV K05 00052M

## (undated) DEVICE — ANGIO-SEAL VIP VASCULAR CLOSURE DEVICE: Brand: ANGIO-SEAL

## (undated) DEVICE — TUBING HYDR IRR --

## (undated) DEVICE — FORCEPS BX L240CM JAW DIA2.8MM L CAP W/ NDL MIC MESH TOOTH

## (undated) DEVICE — HI-TORQUE FLOPPY II EXTRA SUPPORT GUIDEWIRE .014 STRAIGHT TIP 2.0 CM X 300 CM: Brand: HI-TORQUE FLOPPY

## (undated) DEVICE — KIT MED IMAG CNTRST AGNT W/ IOPAMIDOL REUSE

## (undated) DEVICE — GUIDEWIRE VASC L260CM DIA0.035IN TIP L3MM STD EXCHG PTFE J

## (undated) DEVICE — TR BAND RADIAL ARTERY COMPRESSION DEVICE: Brand: TR BAND

## (undated) DEVICE — PACK PROCEDURE SURG HRT CATH

## (undated) DEVICE — CATHETER PTCA L138CM BLLN L15MM DIA2.5MM CROSSING PROF

## (undated) DEVICE — ANGIOGRAPHY KIT

## (undated) DEVICE — PINNACLE INTRODUCER SHEATH: Brand: PINNACLE

## (undated) DEVICE — GLIDESHEATH SLENDER STAINLESS STEEL KIT: Brand: GLIDESHEATH SLENDER

## (undated) DEVICE — KIT MFLD ISOLATN NACL CNTRST PRT TBNG SPIK W/ PRSS TRNSDUC

## (undated) DEVICE — CATH GUID COR JL4.0 6FR 100CM -- LAUNCHER

## (undated) DEVICE — HI-TORQUE VERSACORE MODIFIED J GUIDE WIRE SYSTEM 145 CM: Brand: HI-TORQUE VERSACORE

## (undated) DEVICE — FCPS RAD JAW 4LC 240CM W/NDL -- BX/40